# Patient Record
Sex: MALE | Race: WHITE | Employment: OTHER | URBAN - METROPOLITAN AREA
[De-identification: names, ages, dates, MRNs, and addresses within clinical notes are randomized per-mention and may not be internally consistent; named-entity substitution may affect disease eponyms.]

---

## 2017-03-09 ENCOUNTER — APPOINTMENT (OUTPATIENT)
Dept: CT IMAGING | Age: 67
End: 2017-03-09
Attending: EMERGENCY MEDICINE
Payer: MEDICARE

## 2017-03-09 ENCOUNTER — HOSPITAL ENCOUNTER (EMERGENCY)
Age: 67
Discharge: HOME | End: 2017-03-09
Attending: EMERGENCY MEDICINE
Payer: MEDICARE

## 2017-03-09 VITALS
DIASTOLIC BLOOD PRESSURE: 82 MMHG | OXYGEN SATURATION: 98 % | SYSTOLIC BLOOD PRESSURE: 156 MMHG | HEIGHT: 70 IN | RESPIRATION RATE: 16 BRPM | WEIGHT: 215 LBS | HEART RATE: 73 BPM | BODY MASS INDEX: 30.78 KG/M2 | TEMPERATURE: 98 F

## 2017-03-09 DIAGNOSIS — R19.7 DIARRHEA, UNSPECIFIED TYPE: ICD-10-CM

## 2017-03-09 DIAGNOSIS — R10.9 ABDOMINAL PAIN OF UNKNOWN ETIOLOGY: Primary | ICD-10-CM

## 2017-03-09 LAB
ALBUMIN SERPL-MCNC: 3.9 G/DL (ref 3.5–4.8)
ALP LIVER SERPL-CCNC: 87 U/L (ref 45–117)
ALT SERPL-CCNC: 36 U/L (ref 17–63)
AST SERPL-CCNC: 19 U/L (ref 15–41)
BASOPHILS # BLD AUTO: 0.01 X10(3) UL (ref 0–0.1)
BASOPHILS NFR BLD AUTO: 0.1 %
BILIRUB SERPL-MCNC: 0.7 MG/DL (ref 0.1–2)
BILIRUB UR QL STRIP.AUTO: NEGATIVE
BUN BLD-MCNC: 17 MG/DL (ref 8–20)
CALCIUM BLD-MCNC: 9.1 MG/DL (ref 8.3–10.3)
CHLORIDE: 104 MMOL/L (ref 101–111)
CLARITY UR REFRACT.AUTO: CLEAR
CO2: 24 MMOL/L (ref 22–32)
COLOR UR AUTO: YELLOW
CREAT BLD-MCNC: 1.06 MG/DL (ref 0.7–1.3)
EOSINOPHIL # BLD AUTO: 0.09 X10(3) UL (ref 0–0.3)
EOSINOPHIL NFR BLD AUTO: 1.1 %
ERYTHROCYTE [DISTWIDTH] IN BLOOD BY AUTOMATED COUNT: 13.2 % (ref 11.5–16)
GLUCOSE BLD-MCNC: 255 MG/DL (ref 70–99)
GLUCOSE UR STRIP.AUTO-MCNC: 500 MG/DL
HCT VFR BLD AUTO: 48.3 % (ref 37–53)
HGB BLD-MCNC: 15.8 G/DL (ref 13–17)
IMMATURE GRANULOCYTE COUNT: 0.02 X10(3) UL (ref 0–1)
IMMATURE GRANULOCYTE RATIO %: 0.3 %
KETONES UR STRIP.AUTO-MCNC: 40 MG/DL
LEUKOCYTE ESTERASE UR QL STRIP.AUTO: NEGATIVE
LIPASE: 153 U/L (ref 73–393)
LYMPHOCYTES # BLD AUTO: 2.04 X10(3) UL (ref 0.9–4)
LYMPHOCYTES NFR BLD AUTO: 26 %
M PROTEIN MFR SERPL ELPH: 7.8 G/DL (ref 6.1–8.3)
MCH RBC QN AUTO: 26.9 PG (ref 27–33.2)
MCHC RBC AUTO-ENTMCNC: 32.7 G/DL (ref 31–37)
MCV RBC AUTO: 82.1 FL (ref 80–99)
MONOCYTES # BLD AUTO: 0.59 X10(3) UL (ref 0.1–0.6)
MONOCYTES NFR BLD AUTO: 7.5 %
NEUTROPHIL ABS PRELIM: 5.09 X10 (3) UL (ref 1.3–6.7)
NEUTROPHILS # BLD AUTO: 5.09 X10(3) UL (ref 1.3–6.7)
NEUTROPHILS NFR BLD AUTO: 65 %
NITRITE UR QL STRIP.AUTO: NEGATIVE
PH UR STRIP.AUTO: 5 [PH] (ref 4.5–8)
PLATELET # BLD AUTO: 220 10(3)UL (ref 150–450)
POTASSIUM SERPL-SCNC: 4 MMOL/L (ref 3.6–5.1)
PROT UR STRIP.AUTO-MCNC: NEGATIVE MG/DL
RBC # BLD AUTO: 5.88 X10(6)UL (ref 3.8–5.8)
RED CELL DISTRIBUTION WIDTH-SD: 39.6 FL (ref 35.1–46.3)
SODIUM SERPL-SCNC: 138 MMOL/L (ref 136–144)
SP GR UR STRIP.AUTO: 1.02 (ref 1–1.03)
UROBILINOGEN UR STRIP.AUTO-MCNC: 0.2 MG/DL
WBC # BLD AUTO: 7.8 X10(3) UL (ref 4–13)

## 2017-03-09 PROCEDURE — 85025 COMPLETE CBC W/AUTO DIFF WBC: CPT | Performed by: EMERGENCY MEDICINE

## 2017-03-09 PROCEDURE — 36415 COLL VENOUS BLD VENIPUNCTURE: CPT

## 2017-03-09 PROCEDURE — 81003 URINALYSIS AUTO W/O SCOPE: CPT | Performed by: EMERGENCY MEDICINE

## 2017-03-09 PROCEDURE — 99284 EMERGENCY DEPT VISIT MOD MDM: CPT

## 2017-03-09 PROCEDURE — 74177 CT ABD & PELVIS W/CONTRAST: CPT

## 2017-03-09 PROCEDURE — 80053 COMPREHEN METABOLIC PANEL: CPT | Performed by: EMERGENCY MEDICINE

## 2017-03-09 PROCEDURE — 83690 ASSAY OF LIPASE: CPT | Performed by: EMERGENCY MEDICINE

## 2017-03-09 NOTE — ED PROVIDER NOTES
Patient Seen in: THE [facility] In [place]    History   Patient presents with:  Abdomen/Flank Pain (GI/)    Stated Complaint: abd pain    HPI    41-year-old male presents emergency room with chief complaint of right-sided abdominal pain Smoking Status: Never Smoker                      Smokeless Status: Never Used                        Alcohol Use: No                Review of Systems    Positive for stated complaint: abd pain  Other systems are as noted in HPI.   Constitutional and vital following orders were created for panel order CBC WITH DIFFERENTIAL WITH PLATELET.   Procedure                               Abnormality         Status                     ---------                               -----------         ------

## 2017-03-09 NOTE — ED INITIAL ASSESSMENT (HPI)
Right lower abdominal pain, intermittent, past week. Describes it as \"it feels like a golf ball is in there. \" Frequent bowel movements.  No NV

## 2018-07-11 PROBLEM — M75.41 IMPINGEMENT SYNDROME OF RIGHT SHOULDER REGION: Status: ACTIVE | Noted: 2018-07-11

## 2019-05-06 PROBLEM — M19.011 GLENOHUMERAL ARTHRITIS, RIGHT: Status: ACTIVE | Noted: 2019-05-06

## 2019-12-23 ENCOUNTER — OFFICE VISIT (OUTPATIENT)
Dept: FAMILY MEDICINE CLINIC | Facility: CLINIC | Age: 69
End: 2019-12-23
Payer: MEDICARE

## 2019-12-23 VITALS
TEMPERATURE: 99 F | BODY MASS INDEX: 31.4 KG/M2 | HEIGHT: 70 IN | WEIGHT: 219.31 LBS | OXYGEN SATURATION: 99 % | HEART RATE: 82 BPM | SYSTOLIC BLOOD PRESSURE: 160 MMHG | DIASTOLIC BLOOD PRESSURE: 80 MMHG | RESPIRATION RATE: 18 BRPM

## 2019-12-23 DIAGNOSIS — J02.9 SORE THROAT: Primary | ICD-10-CM

## 2019-12-23 PROCEDURE — 99203 OFFICE O/P NEW LOW 30 MIN: CPT | Performed by: NURSE PRACTITIONER

## 2019-12-23 PROCEDURE — 87880 STREP A ASSAY W/OPTIC: CPT | Performed by: NURSE PRACTITIONER

## 2019-12-23 NOTE — PROGRESS NOTES
HPI:    Patient ID: Rey Paredes is a 78year old male. Sore Throat    This is a new problem. Episode onset: in the past two days. The problem has been unchanged. Neither side of throat is experiencing more pain than the other. There has been no fever. Cardiovascular: Normal rate, regular rhythm and normal heart sounds. Pulmonary/Chest: Effort normal and breath sounds normal. No respiratory distress. Abdominal: Soft. Musculoskeletal: Normal range of motion.      Lymphadenopathy:     He has cervical ad · Suck on throat lozenges, cough drops, hard candy, ice chips, or frozen fruit-juice bars. Use the sugar-free versions if your diet or medical condition requires them. Gargle to ease irritation  Gargling every hour or 2 can ease irritation.  Try gargling w

## 2022-07-06 DIAGNOSIS — E10.9 TYPE 1 DIABETES MELLITUS WITHOUT COMPLICATION (CMD): Primary | ICD-10-CM

## 2022-07-06 RX ORDER — INSULIN LISPRO 100 [IU]/ML
50 INJECTION, SOLUTION INTRAVENOUS; SUBCUTANEOUS
Qty: 45 ML | Refills: 2 | Status: SHIPPED | OUTPATIENT
Start: 2022-07-06 | End: 2022-12-21 | Stop reason: SDUPTHER

## 2022-07-06 RX ORDER — INSULIN LISPRO 100 [IU]/ML
50 INJECTION, SOLUTION INTRAVENOUS; SUBCUTANEOUS
Qty: 45 ML | Refills: 2 | Status: SHIPPED | OUTPATIENT
Start: 2022-07-06 | End: 2022-07-06 | Stop reason: SDUPTHER

## 2022-07-26 ENCOUNTER — TELEPHONE (OUTPATIENT)
Dept: PEDIATRIC ENDOCRINOLOGY | Age: 72
End: 2022-07-26

## 2022-07-26 DIAGNOSIS — E11.8 TYPE 2 DIABETES WITH COMPLICATION (CMD): Primary | ICD-10-CM

## 2022-07-26 RX ORDER — INSULIN HUMAN 500 [IU]/ML
50 INJECTION, SOLUTION SUBCUTANEOUS SEE ADMIN INSTRUCTIONS
Qty: 27 ML | Refills: 11 | Status: SHIPPED | OUTPATIENT
Start: 2022-07-26 | End: 2023-11-03

## 2022-07-29 ENCOUNTER — TELEPHONE (OUTPATIENT)
Dept: PEDIATRIC ENDOCRINOLOGY | Age: 72
End: 2022-07-29

## 2022-08-01 ENCOUNTER — MED INFO FORMS (OUTPATIENT)
Dept: HEALTH INFORMATION MANAGEMENT | Facility: OTHER | Age: 72
End: 2022-08-01

## 2022-08-01 DIAGNOSIS — E11.8 TYPE 2 DIABETES WITH COMPLICATION (CMD): ICD-10-CM

## 2022-08-01 RX ORDER — LANCETS 33 GAUGE
EACH MISCELLANEOUS
COMMUNITY

## 2022-10-07 ENCOUNTER — APPOINTMENT (OUTPATIENT)
Dept: CT IMAGING | Age: 72
End: 2022-10-07
Attending: EMERGENCY MEDICINE
Payer: MEDICARE

## 2022-10-07 ENCOUNTER — HOSPITAL ENCOUNTER (EMERGENCY)
Age: 72
Discharge: HOME | End: 2022-10-07
Attending: EMERGENCY MEDICINE
Payer: MEDICARE

## 2022-10-07 VITALS
HEART RATE: 72 BPM | DIASTOLIC BLOOD PRESSURE: 58 MMHG | SYSTOLIC BLOOD PRESSURE: 135 MMHG | OXYGEN SATURATION: 97 % | HEIGHT: 70 IN | WEIGHT: 210 LBS | BODY MASS INDEX: 30.06 KG/M2 | RESPIRATION RATE: 20 BRPM | TEMPERATURE: 97 F

## 2022-10-07 DIAGNOSIS — A08.4 VIRAL ENTERITIS: Primary | ICD-10-CM

## 2022-10-07 DIAGNOSIS — K57.30 DIVERTICULOSIS OF COLON: ICD-10-CM

## 2022-10-07 DIAGNOSIS — N28.9 RENAL INSUFFICIENCY: ICD-10-CM

## 2022-10-07 DIAGNOSIS — R60.9 PERIPHERAL EDEMA: ICD-10-CM

## 2022-10-07 LAB
ALBUMIN SERPL-MCNC: 3.6 G/DL (ref 3.4–5)
ALBUMIN/GLOB SERPL: 1 {RATIO} (ref 1–2)
ALP LIVER SERPL-CCNC: 91 U/L
ALT SERPL-CCNC: 31 U/L
ANION GAP SERPL CALC-SCNC: 7 MMOL/L (ref 0–18)
AST SERPL-CCNC: 19 U/L (ref 15–37)
BASOPHILS # BLD AUTO: 0.01 X10(3) UL (ref 0–0.2)
BASOPHILS NFR BLD AUTO: 0.1 %
BILIRUB SERPL-MCNC: 0.6 MG/DL (ref 0.1–2)
BILIRUB UR QL STRIP.AUTO: NEGATIVE
BUN BLD-MCNC: 30 MG/DL (ref 7–18)
CALCIUM BLD-MCNC: 8.8 MG/DL (ref 8.5–10.1)
CHLORIDE SERPL-SCNC: 105 MMOL/L (ref 98–112)
CHOLEST SERPL-MCNC: 224 MG/DL (ref ?–200)
CLARITY UR REFRACT.AUTO: CLEAR
CO2 SERPL-SCNC: 25 MMOL/L (ref 21–32)
COLOR UR AUTO: YELLOW
CREAT BLD-MCNC: 1.86 MG/DL
EOSINOPHIL # BLD AUTO: 0.18 X10(3) UL (ref 0–0.7)
EOSINOPHIL NFR BLD AUTO: 1.8 %
ERYTHROCYTE [DISTWIDTH] IN BLOOD BY AUTOMATED COUNT: 13.1 %
GFR SERPLBLD BASED ON 1.73 SQ M-ARVRAT: 38 ML/MIN/1.73M2 (ref 60–?)
GLOBULIN PLAS-MCNC: 3.7 G/DL (ref 2.8–4.4)
GLUCOSE BLD-MCNC: 262 MG/DL (ref 70–99)
GLUCOSE UR STRIP.AUTO-MCNC: 500 MG/DL
HCT VFR BLD AUTO: 40.5 %
HDLC SERPL-MCNC: 56 MG/DL (ref 40–59)
HGB BLD-MCNC: 13.4 G/DL
IMM GRANULOCYTES # BLD AUTO: 0.02 X10(3) UL (ref 0–1)
IMM GRANULOCYTES NFR BLD: 0.2 %
LDLC SERPL CALC-MCNC: 144 MG/DL (ref ?–100)
LEUKOCYTE ESTERASE UR QL STRIP.AUTO: NEGATIVE
LIPASE SERPL-CCNC: 128 U/L (ref 73–393)
LYMPHOCYTES # BLD AUTO: 2.65 X10(3) UL (ref 1–4)
LYMPHOCYTES NFR BLD AUTO: 26.6 %
MCH RBC QN AUTO: 27.6 PG (ref 26–34)
MCHC RBC AUTO-ENTMCNC: 33.1 G/DL (ref 31–37)
MCV RBC AUTO: 83.3 FL
MONOCYTES # BLD AUTO: 0.67 X10(3) UL (ref 0.1–1)
MONOCYTES NFR BLD AUTO: 6.7 %
NEUTROPHILS # BLD AUTO: 6.43 X10 (3) UL (ref 1.5–7.7)
NEUTROPHILS # BLD AUTO: 6.43 X10(3) UL (ref 1.5–7.7)
NEUTROPHILS NFR BLD AUTO: 64.6 %
NITRITE UR QL STRIP.AUTO: NEGATIVE
NONHDLC SERPL-MCNC: 168 MG/DL (ref ?–130)
NT-PROBNP SERPL-MCNC: 133 PG/ML (ref ?–125)
OSMOLALITY SERPL CALC.SUM OF ELEC: 299 MOSM/KG (ref 275–295)
PH UR STRIP.AUTO: 5 [PH] (ref 5–8)
PLATELET # BLD AUTO: 293 10(3)UL (ref 150–450)
POTASSIUM SERPL-SCNC: 4.4 MMOL/L (ref 3.5–5.1)
PROT SERPL-MCNC: 7.3 G/DL (ref 6.4–8.2)
RBC # BLD AUTO: 4.86 X10(6)UL
SODIUM SERPL-SCNC: 137 MMOL/L (ref 136–145)
SP GR UR STRIP.AUTO: 1.02 (ref 1–1.03)
TRIGL SERPL-MCNC: 137 MG/DL (ref 30–149)
TROPONIN I HIGH SENSITIVITY: 10 NG/L
UROBILINOGEN UR STRIP.AUTO-MCNC: 0.2 MG/DL
VLDLC SERPL CALC-MCNC: 26 MG/DL (ref 0–30)
WBC # BLD AUTO: 10 X10(3) UL (ref 4–11)

## 2022-10-07 PROCEDURE — 93010 ELECTROCARDIOGRAM REPORT: CPT

## 2022-10-07 PROCEDURE — 81015 MICROSCOPIC EXAM OF URINE: CPT | Performed by: EMERGENCY MEDICINE

## 2022-10-07 PROCEDURE — 81001 URINALYSIS AUTO W/SCOPE: CPT | Performed by: EMERGENCY MEDICINE

## 2022-10-07 PROCEDURE — 83690 ASSAY OF LIPASE: CPT | Performed by: EMERGENCY MEDICINE

## 2022-10-07 PROCEDURE — 99284 EMERGENCY DEPT VISIT MOD MDM: CPT

## 2022-10-07 PROCEDURE — 93005 ELECTROCARDIOGRAM TRACING: CPT

## 2022-10-07 PROCEDURE — 74176 CT ABD & PELVIS W/O CONTRAST: CPT | Performed by: EMERGENCY MEDICINE

## 2022-10-07 PROCEDURE — 36415 COLL VENOUS BLD VENIPUNCTURE: CPT

## 2022-10-07 PROCEDURE — 83880 ASSAY OF NATRIURETIC PEPTIDE: CPT | Performed by: EMERGENCY MEDICINE

## 2022-10-07 PROCEDURE — 80061 LIPID PANEL: CPT | Performed by: EMERGENCY MEDICINE

## 2022-10-07 PROCEDURE — 84484 ASSAY OF TROPONIN QUANT: CPT | Performed by: EMERGENCY MEDICINE

## 2022-10-07 PROCEDURE — 85025 COMPLETE CBC W/AUTO DIFF WBC: CPT | Performed by: EMERGENCY MEDICINE

## 2022-10-07 PROCEDURE — 80053 COMPREHEN METABOLIC PANEL: CPT | Performed by: EMERGENCY MEDICINE

## 2022-10-07 RX ORDER — DICYCLOMINE HCL 20 MG
20 TABLET ORAL 4 TIMES DAILY PRN
Qty: 30 TABLET | Refills: 0 | Status: SHIPPED | OUTPATIENT
Start: 2022-10-07 | End: 2022-11-06

## 2022-10-07 RX ORDER — PHYTONADIONE (VIT K1) 100 MCG
TABLET ORAL
COMMUNITY

## 2022-10-07 RX ORDER — PANTOPRAZOLE SODIUM 40 MG/1
40 TABLET, DELAYED RELEASE ORAL DAILY
Qty: 30 TABLET | Refills: 0 | Status: SHIPPED | OUTPATIENT
Start: 2022-10-07 | End: 2022-11-06

## 2022-10-07 RX ORDER — MAGNESIUM OXIDE 400 MG (241.3 MG MAGNESIUM) TABLET
120 TABLET
COMMUNITY

## 2022-10-07 NOTE — ED INITIAL ASSESSMENT (HPI)
LUQ abd pain for several weeks. States possible worse after eating. States sometimes feels nauseous but has not vomited.

## 2022-10-08 LAB
ATRIAL RATE: 84 BPM
P AXIS: 37 DEGREES
P-R INTERVAL: 192 MS
Q-T INTERVAL: 398 MS
QRS DURATION: 142 MS
QTC CALCULATION (BEZET): 450 MS
R AXIS: -50 DEGREES
T AXIS: 11 DEGREES
VENTRICULAR RATE: 77 BPM

## 2022-12-20 ENCOUNTER — TELEPHONE (OUTPATIENT)
Dept: ENDOCRINOLOGY | Age: 72
End: 2022-12-20

## 2022-12-20 DIAGNOSIS — E10.9 TYPE 1 DIABETES MELLITUS WITHOUT COMPLICATION (CMD): ICD-10-CM

## 2022-12-20 DIAGNOSIS — E11.8 TYPE 2 DIABETES WITH COMPLICATION (CMD): ICD-10-CM

## 2022-12-21 RX ORDER — INSULIN LISPRO 100 [IU]/ML
50 INJECTION, SOLUTION INTRAVENOUS; SUBCUTANEOUS
Qty: 45 ML | Refills: 2 | Status: SHIPPED | OUTPATIENT
Start: 2022-12-21 | End: 2022-12-21 | Stop reason: SDUPTHER

## 2022-12-21 RX ORDER — INSULIN LISPRO 100 [IU]/ML
50 INJECTION, SOLUTION INTRAVENOUS; SUBCUTANEOUS
Qty: 135 ML | Refills: 0 | Status: SHIPPED | OUTPATIENT
Start: 2022-12-21 | End: 2023-02-02 | Stop reason: CLARIF

## 2022-12-28 ENCOUNTER — TELEPHONE (OUTPATIENT)
Dept: PEDIATRIC ENDOCRINOLOGY | Age: 72
End: 2022-12-28

## 2023-01-03 ENCOUNTER — EXTERNAL RECORD (OUTPATIENT)
Dept: HEALTH INFORMATION MANAGEMENT | Facility: OTHER | Age: 73
End: 2023-01-03

## 2023-02-01 ENCOUNTER — OFFICE VISIT (OUTPATIENT)
Dept: ENDOCRINOLOGY | Age: 73
End: 2023-02-01

## 2023-02-01 VITALS
WEIGHT: 212.08 LBS | HEART RATE: 84 BPM | TEMPERATURE: 97.9 F | SYSTOLIC BLOOD PRESSURE: 169 MMHG | DIASTOLIC BLOOD PRESSURE: 82 MMHG

## 2023-02-01 DIAGNOSIS — I10 ESSENTIAL HYPERTENSION: ICD-10-CM

## 2023-02-01 DIAGNOSIS — N18.32 STAGE 3B CHRONIC KIDNEY DISEASE (CMD): ICD-10-CM

## 2023-02-01 DIAGNOSIS — G63 POLYNEUROPATHY IN DISEASES CLASSIFIED ELSEWHERE (CMD): ICD-10-CM

## 2023-02-01 DIAGNOSIS — E78.5 DYSLIPIDEMIA: ICD-10-CM

## 2023-02-01 DIAGNOSIS — E11.8 TYPE 2 DIABETES WITH COMPLICATION (CMD): Primary | ICD-10-CM

## 2023-02-01 LAB — HBA1C MFR BLD: 11.6 % (ref 4.5–5.6)

## 2023-02-01 PROCEDURE — 3079F DIAST BP 80-89 MM HG: CPT | Performed by: INTERNAL MEDICINE

## 2023-02-01 PROCEDURE — 3077F SYST BP >= 140 MM HG: CPT | Performed by: INTERNAL MEDICINE

## 2023-02-01 PROCEDURE — 99214 OFFICE O/P EST MOD 30 MIN: CPT | Performed by: INTERNAL MEDICINE

## 2023-02-01 PROCEDURE — 36415 COLL VENOUS BLD VENIPUNCTURE: CPT | Performed by: INTERNAL MEDICINE

## 2023-02-01 PROCEDURE — 83036 HEMOGLOBIN GLYCOSYLATED A1C: CPT | Performed by: INTERNAL MEDICINE

## 2023-02-01 RX ORDER — ATORVASTATIN CALCIUM 20 MG/1
20 TABLET, FILM COATED ORAL AT BEDTIME
Qty: 90 TABLET | Refills: 3 | Status: SHIPPED | OUTPATIENT
Start: 2023-02-01

## 2023-02-01 RX ORDER — DULAGLUTIDE 0.75 MG/.5ML
0.75 INJECTION, SOLUTION SUBCUTANEOUS
Qty: 2 ML | Refills: 1 | Status: SHIPPED | OUTPATIENT
Start: 2023-02-01 | End: 2023-11-03

## 2023-02-01 RX ORDER — AMLODIPINE BESYLATE 10 MG/1
10 TABLET ORAL DAILY
Qty: 90 TABLET | Refills: 3 | Status: SHIPPED | OUTPATIENT
Start: 2023-02-01

## 2023-03-03 ENCOUNTER — TELEPHONE (OUTPATIENT)
Dept: ENDOCRINOLOGY | Age: 73
End: 2023-03-03

## 2023-03-10 ENCOUNTER — CLINICAL DOCUMENTATION (OUTPATIENT)
Dept: PEDIATRIC ENDOCRINOLOGY | Age: 73
End: 2023-03-10

## 2023-10-19 ENCOUNTER — TELEPHONE (OUTPATIENT)
Dept: ENDOCRINOLOGY | Age: 73
End: 2023-10-19

## 2023-11-01 ENCOUNTER — TELEPHONE (OUTPATIENT)
Dept: ENDOCRINOLOGY | Age: 73
End: 2023-11-01

## 2023-11-03 ENCOUNTER — OFFICE VISIT (OUTPATIENT)
Dept: ENDOCRINOLOGY | Age: 73
End: 2023-11-03

## 2023-11-03 ENCOUNTER — TELEPHONE (OUTPATIENT)
Dept: PEDIATRIC ENDOCRINOLOGY | Age: 73
End: 2023-11-03

## 2023-11-03 VITALS
SYSTOLIC BLOOD PRESSURE: 136 MMHG | WEIGHT: 210.54 LBS | BODY MASS INDEX: 35.08 KG/M2 | HEIGHT: 65 IN | DIASTOLIC BLOOD PRESSURE: 85 MMHG | OXYGEN SATURATION: 97 % | HEART RATE: 73 BPM

## 2023-11-03 DIAGNOSIS — E11.65 UNCONTROLLED TYPE 2 DIABETES MELLITUS WITH HYPERGLYCEMIA, WITH LONG-TERM CURRENT USE OF INSULIN (CMD): Primary | ICD-10-CM

## 2023-11-03 DIAGNOSIS — N18.32 STAGE 3B CHRONIC KIDNEY DISEASE (CMD): ICD-10-CM

## 2023-11-03 DIAGNOSIS — Z53.20 MEDICATION THERAPY MANAGEMENT RECOMMENDATION REFUSED BY PATIENT: ICD-10-CM

## 2023-11-03 DIAGNOSIS — Z79.4 UNCONTROLLED TYPE 2 DIABETES MELLITUS WITH HYPERGLYCEMIA, WITH LONG-TERM CURRENT USE OF INSULIN (CMD): Primary | ICD-10-CM

## 2023-11-03 DIAGNOSIS — E88.1 LIPODYSTROPHY DUE TO INSULIN: ICD-10-CM

## 2023-11-03 DIAGNOSIS — I10 ESSENTIAL HYPERTENSION: ICD-10-CM

## 2023-11-03 DIAGNOSIS — Z91.148 NON COMPLIANCE W MEDICATION REGIMEN: ICD-10-CM

## 2023-11-03 DIAGNOSIS — G63 POLYNEUROPATHY IN DISEASES CLASSIFIED ELSEWHERE (CMD): ICD-10-CM

## 2023-11-03 DIAGNOSIS — R63.8 DIETARY INDISCRETION: ICD-10-CM

## 2023-11-03 DIAGNOSIS — E78.5 DYSLIPIDEMIA: ICD-10-CM

## 2023-11-03 PROCEDURE — 3075F SYST BP GE 130 - 139MM HG: CPT | Performed by: INTERNAL MEDICINE

## 2023-11-03 PROCEDURE — 3079F DIAST BP 80-89 MM HG: CPT | Performed by: INTERNAL MEDICINE

## 2023-11-03 PROCEDURE — 99214 OFFICE O/P EST MOD 30 MIN: CPT | Performed by: INTERNAL MEDICINE

## 2023-12-03 DIAGNOSIS — E11.8 TYPE 2 DIABETES WITH COMPLICATION (CMD): ICD-10-CM

## 2023-12-05 RX ORDER — INSULIN HUMAN 500 [IU]/ML
INJECTION, SOLUTION SUBCUTANEOUS
Qty: 27 ML | Refills: 11 | Status: SHIPPED | OUTPATIENT
Start: 2023-12-05

## 2024-02-29 ENCOUNTER — APPOINTMENT (OUTPATIENT)
Dept: ENDOCRINOLOGY | Age: 74
End: 2024-02-29

## 2024-04-19 ENCOUNTER — HOSPITAL ENCOUNTER (INPATIENT)
Facility: HOSPITAL | Age: 74
LOS: 6 days | Discharge: NURSING, REHAB OR LONG-TERM CARE | End: 2024-04-26
Attending: EMERGENCY MEDICINE | Admitting: HOSPITALIST
Payer: MEDICARE

## 2024-04-19 ENCOUNTER — APPOINTMENT (OUTPATIENT)
Dept: GENERAL RADIOLOGY | Facility: HOSPITAL | Age: 74
End: 2024-04-19
Payer: MEDICARE

## 2024-04-19 ENCOUNTER — APPOINTMENT (OUTPATIENT)
Dept: MRI IMAGING | Facility: HOSPITAL | Age: 74
End: 2024-04-19
Attending: EMERGENCY MEDICINE
Payer: MEDICARE

## 2024-04-19 DIAGNOSIS — I63.9 ACUTE CVA (CEREBROVASCULAR ACCIDENT) (HCC): Primary | ICD-10-CM

## 2024-04-19 LAB
ALBUMIN SERPL-MCNC: 3.6 G/DL (ref 3.4–5)
ALBUMIN/GLOB SERPL: 1 {RATIO} (ref 1–2)
ALP LIVER SERPL-CCNC: 99 U/L
ALT SERPL-CCNC: 23 U/L
ANION GAP SERPL CALC-SCNC: 9 MMOL/L (ref 0–18)
APTT PPP: 25.4 SECONDS (ref 23.3–35.6)
AST SERPL-CCNC: 25 U/L (ref 15–37)
BASOPHILS # BLD AUTO: 0.01 X10(3) UL (ref 0–0.2)
BASOPHILS NFR BLD AUTO: 0.1 %
BILIRUB SERPL-MCNC: 0.4 MG/DL (ref 0.1–2)
BILIRUB UR QL STRIP.AUTO: NEGATIVE
BUN BLD-MCNC: 33 MG/DL (ref 9–23)
CALCIUM BLD-MCNC: 8.9 MG/DL (ref 8.5–10.1)
CHLORIDE SERPL-SCNC: 109 MMOL/L (ref 98–112)
CLARITY UR REFRACT.AUTO: CLEAR
CO2 SERPL-SCNC: 22 MMOL/L (ref 21–32)
CREAT BLD-MCNC: 1.85 MG/DL
EGFRCR SERPLBLD CKD-EPI 2021: 38 ML/MIN/1.73M2 (ref 60–?)
EOSINOPHIL # BLD AUTO: 0.1 X10(3) UL (ref 0–0.7)
EOSINOPHIL NFR BLD AUTO: 1.1 %
ERYTHROCYTE [DISTWIDTH] IN BLOOD BY AUTOMATED COUNT: 13.2 %
GLOBULIN PLAS-MCNC: 3.7 G/DL (ref 2.8–4.4)
GLUCOSE BLD-MCNC: 197 MG/DL (ref 70–99)
GLUCOSE BLD-MCNC: 206 MG/DL (ref 70–99)
GLUCOSE UR STRIP.AUTO-MCNC: 300 MG/DL
HCT VFR BLD AUTO: 40.9 %
HGB BLD-MCNC: 13.4 G/DL
IMM GRANULOCYTES # BLD AUTO: 0.02 X10(3) UL (ref 0–1)
IMM GRANULOCYTES NFR BLD: 0.2 %
INR BLD: 1 (ref 0.8–1.2)
KETONES UR STRIP.AUTO-MCNC: 10 MG/DL
LEUKOCYTE ESTERASE UR QL STRIP.AUTO: NEGATIVE
LYMPHOCYTES # BLD AUTO: 1.42 X10(3) UL (ref 1–4)
LYMPHOCYTES NFR BLD AUTO: 15.8 %
MCH RBC QN AUTO: 27 PG (ref 26–34)
MCHC RBC AUTO-ENTMCNC: 32.8 G/DL (ref 31–37)
MCV RBC AUTO: 82.5 FL
MONOCYTES # BLD AUTO: 0.63 X10(3) UL (ref 0.1–1)
MONOCYTES NFR BLD AUTO: 7 %
NEUTROPHILS # BLD AUTO: 6.8 X10 (3) UL (ref 1.5–7.7)
NEUTROPHILS # BLD AUTO: 6.8 X10(3) UL (ref 1.5–7.7)
NEUTROPHILS NFR BLD AUTO: 75.8 %
NITRITE UR QL STRIP.AUTO: NEGATIVE
OSMOLALITY SERPL CALC.SUM OF ELEC: 303 MOSM/KG (ref 275–295)
PH UR STRIP.AUTO: 5.5 [PH] (ref 5–8)
PLATELET # BLD AUTO: 274 10(3)UL (ref 150–450)
POTASSIUM SERPL-SCNC: 4.1 MMOL/L (ref 3.5–5.1)
PROT SERPL-MCNC: 7.3 G/DL (ref 6.4–8.2)
PROT UR STRIP.AUTO-MCNC: 100 MG/DL
PROTHROMBIN TIME: 13.2 SECONDS (ref 11.6–14.8)
RBC # BLD AUTO: 4.96 X10(6)UL
RBC #/AREA URNS AUTO: >10 /HPF
SODIUM SERPL-SCNC: 140 MMOL/L (ref 136–145)
SP GR UR STRIP.AUTO: 1.02 (ref 1–1.03)
TROPONIN I SERPL HS-MCNC: 10 NG/L
UROBILINOGEN UR STRIP.AUTO-MCNC: NORMAL MG/DL
WBC # BLD AUTO: 9 X10(3) UL (ref 4–11)

## 2024-04-19 PROCEDURE — 70546 MR ANGIOGRAPH HEAD W/O&W/DYE: CPT | Performed by: EMERGENCY MEDICINE

## 2024-04-19 PROCEDURE — 71045 X-RAY EXAM CHEST 1 VIEW: CPT | Performed by: EMERGENCY MEDICINE

## 2024-04-19 PROCEDURE — 70549 MR ANGIOGRAPH NECK W/O&W/DYE: CPT | Performed by: EMERGENCY MEDICINE

## 2024-04-19 PROCEDURE — 70553 MRI BRAIN STEM W/O & W/DYE: CPT | Performed by: EMERGENCY MEDICINE

## 2024-04-19 RX ORDER — CLOPIDOGREL BISULFATE 75 MG/1
75 TABLET ORAL ONCE
Status: COMPLETED | OUTPATIENT
Start: 2024-04-19 | End: 2024-04-19

## 2024-04-19 RX ORDER — ASPIRIN 81 MG/1
81 TABLET, CHEWABLE ORAL ONCE
Status: COMPLETED | OUTPATIENT
Start: 2024-04-19 | End: 2024-04-19

## 2024-04-19 RX ORDER — ATORVASTATIN CALCIUM 20 MG/1
20 TABLET, FILM COATED ORAL NIGHTLY
Status: DISCONTINUED | OUTPATIENT
Start: 2024-04-19 | End: 2024-04-20

## 2024-04-19 RX ORDER — LORAZEPAM 1 MG/1
1 TABLET ORAL ONCE
Status: COMPLETED | OUTPATIENT
Start: 2024-04-19 | End: 2024-04-19

## 2024-04-19 RX ORDER — GADOTERATE MEGLUMINE 376.9 MG/ML
20 INJECTION INTRAVENOUS
Status: COMPLETED | OUTPATIENT
Start: 2024-04-19 | End: 2024-04-19

## 2024-04-19 NOTE — ED INITIAL ASSESSMENT (HPI)
Patient endorses Right leg numbness and weakness, hx of DVT in right leg, no currently on thinners, Spencer negative per EMS. A/o x 4. Ataxia noted on Right side.

## 2024-04-20 ENCOUNTER — APPOINTMENT (OUTPATIENT)
Dept: CV DIAGNOSTICS | Facility: HOSPITAL | Age: 74
End: 2024-04-20
Attending: HOSPITALIST
Payer: MEDICARE

## 2024-04-20 LAB
ATRIAL RATE: 76 BPM
CHOLEST SERPL-MCNC: 208 MG/DL (ref ?–200)
EST. AVERAGE GLUCOSE BLD GHB EST-MCNC: 301 MG/DL (ref 68–126)
GLUCOSE BLD-MCNC: 273 MG/DL (ref 70–99)
GLUCOSE BLD-MCNC: 304 MG/DL (ref 70–99)
GLUCOSE BLD-MCNC: 346 MG/DL (ref 70–99)
HBA1C MFR BLD: 12.1 % (ref ?–5.7)
HDLC SERPL-MCNC: 56 MG/DL (ref 40–59)
LDLC SERPL CALC-MCNC: 134 MG/DL (ref ?–100)
NONHDLC SERPL-MCNC: 152 MG/DL (ref ?–130)
P AXIS: 30 DEGREES
P-R INTERVAL: 194 MS
Q-T INTERVAL: 402 MS
QRS DURATION: 146 MS
QTC CALCULATION (BEZET): 452 MS
R AXIS: -50 DEGREES
T AXIS: 10 DEGREES
TRIGL SERPL-MCNC: 101 MG/DL (ref 30–149)
VENTRICULAR RATE: 76 BPM
VLDLC SERPL CALC-MCNC: 18 MG/DL (ref 0–30)

## 2024-04-20 PROCEDURE — 99223 1ST HOSP IP/OBS HIGH 75: CPT | Performed by: OTHER

## 2024-04-20 PROCEDURE — 93306 TTE W/DOPPLER COMPLETE: CPT | Performed by: HOSPITALIST

## 2024-04-20 RX ORDER — METOCLOPRAMIDE HYDROCHLORIDE 5 MG/ML
10 INJECTION INTRAMUSCULAR; INTRAVENOUS EVERY 8 HOURS PRN
Status: DISCONTINUED | OUTPATIENT
Start: 2024-04-20 | End: 2024-04-26

## 2024-04-20 RX ORDER — DEXTROSE MONOHYDRATE 25 G/50ML
50 INJECTION, SOLUTION INTRAVENOUS
Status: DISCONTINUED | OUTPATIENT
Start: 2024-04-20 | End: 2024-04-26

## 2024-04-20 RX ORDER — ATORVASTATIN CALCIUM 80 MG/1
80 TABLET, FILM COATED ORAL NIGHTLY
Qty: 90 TABLET | Refills: 0 | Status: SHIPPED | OUTPATIENT
Start: 2024-04-20 | End: 2024-07-19

## 2024-04-20 RX ORDER — ACETAMINOPHEN 650 MG/1
650 SUPPOSITORY RECTAL EVERY 4 HOURS PRN
Status: DISCONTINUED | OUTPATIENT
Start: 2024-04-20 | End: 2024-04-26

## 2024-04-20 RX ORDER — HEPARIN SODIUM 5000 [USP'U]/ML
5000 INJECTION, SOLUTION INTRAVENOUS; SUBCUTANEOUS EVERY 8 HOURS SCHEDULED
Status: DISCONTINUED | OUTPATIENT
Start: 2024-04-20 | End: 2024-04-26

## 2024-04-20 RX ORDER — CLOPIDOGREL BISULFATE 75 MG/1
75 TABLET ORAL DAILY
Qty: 90 TABLET | Refills: 0 | Status: SHIPPED | OUTPATIENT
Start: 2024-04-21 | End: 2024-07-20

## 2024-04-20 RX ORDER — SODIUM CHLORIDE 9 MG/ML
INJECTION, SOLUTION INTRAVENOUS CONTINUOUS
Status: ACTIVE | OUTPATIENT
Start: 2024-04-20 | End: 2024-04-22

## 2024-04-20 RX ORDER — ACETAMINOPHEN 500 MG
500 TABLET ORAL EVERY 4 HOURS PRN
Status: DISCONTINUED | OUTPATIENT
Start: 2024-04-20 | End: 2024-04-26

## 2024-04-20 RX ORDER — NICOTINE POLACRILEX 4 MG
30 LOZENGE BUCCAL
Status: DISCONTINUED | OUTPATIENT
Start: 2024-04-20 | End: 2024-04-26

## 2024-04-20 RX ORDER — LABETALOL HYDROCHLORIDE 5 MG/ML
10 INJECTION, SOLUTION INTRAVENOUS EVERY 10 MIN PRN
Status: DISCONTINUED | OUTPATIENT
Start: 2024-04-20 | End: 2024-04-26

## 2024-04-20 RX ORDER — INSULIN DEGLUDEC 100 U/ML
30 INJECTION, SOLUTION SUBCUTANEOUS DAILY
Status: DISCONTINUED | OUTPATIENT
Start: 2024-04-20 | End: 2024-04-21

## 2024-04-20 RX ORDER — CLOPIDOGREL BISULFATE 75 MG/1
75 TABLET ORAL DAILY
Status: DISCONTINUED | OUTPATIENT
Start: 2024-04-21 | End: 2024-04-26

## 2024-04-20 RX ORDER — ASPIRIN 81 MG/1
81 TABLET, CHEWABLE ORAL DAILY
Status: DISCONTINUED | OUTPATIENT
Start: 2024-04-20 | End: 2024-04-26

## 2024-04-20 RX ORDER — ASPIRIN 81 MG/1
81 TABLET, CHEWABLE ORAL DAILY
Qty: 90 TABLET | Refills: 0 | Status: SHIPPED | OUTPATIENT
Start: 2024-04-21 | End: 2024-07-20

## 2024-04-20 RX ORDER — HYDRALAZINE HYDROCHLORIDE 20 MG/ML
10 INJECTION INTRAMUSCULAR; INTRAVENOUS EVERY 2 HOUR PRN
Status: DISCONTINUED | OUTPATIENT
Start: 2024-04-20 | End: 2024-04-24

## 2024-04-20 RX ORDER — NICOTINE POLACRILEX 4 MG
15 LOZENGE BUCCAL
Status: DISCONTINUED | OUTPATIENT
Start: 2024-04-20 | End: 2024-04-26

## 2024-04-20 RX ORDER — ONDANSETRON 2 MG/ML
4 INJECTION INTRAMUSCULAR; INTRAVENOUS EVERY 6 HOURS PRN
Status: DISCONTINUED | OUTPATIENT
Start: 2024-04-20 | End: 2024-04-26

## 2024-04-20 RX ORDER — ATORVASTATIN CALCIUM 80 MG/1
80 TABLET, FILM COATED ORAL NIGHTLY
Status: DISCONTINUED | OUTPATIENT
Start: 2024-04-20 | End: 2024-04-26

## 2024-04-20 RX ORDER — ACETAMINOPHEN 325 MG/1
650 TABLET ORAL EVERY 4 HOURS PRN
Status: DISCONTINUED | OUTPATIENT
Start: 2024-04-20 | End: 2024-04-26

## 2024-04-20 RX ORDER — CLOPIDOGREL BISULFATE 75 MG/1
75 TABLET ORAL DAILY
Status: DISCONTINUED | OUTPATIENT
Start: 2024-04-20 | End: 2024-04-20

## 2024-04-20 NOTE — ED PROVIDER NOTES
Patient Seen in: [facility]      History     Chief Complaint   Patient presents with    Numbness Weakness     Stated Complaint: numbness in Right leg    Subjective:   HPI    74-year-old male history of diabetes presents to ED with complaint of onset of right arm and leg weakness and numbness and difficulty using his right hand starting Thursday morning.  He states he awoke with the symptoms.  Denies speech difficulty, facial droop.   Denies history of stroke.  He states that he mowed the lawn for 2 hours straight on Wednesday and had some cramping in his right leg.  He states he has a history of DVT in the right leg, no longer on thinners.  Denies history of stroke.    Objective:   Past Medical History:    Diabetes mellitus (HCC)    Type II or unspecified type diabetes mellitus without mention of complication, not stated as uncontrolled              Past Surgical History:   Procedure Laterality Date    Colonoscopy      2005    Tonsillectomy                  Social History     Socioeconomic History    Marital status:    Tobacco Use    Smoking status: Never    Smokeless tobacco: Never   Vaping Use    Vaping status: Never Used   Substance and Sexual Activity    Alcohol use: No    Drug use: No     Social Determinants of Health      Received from [facility]    Social Connections    Received from [facility]    Housing Stability              Review of Systems    Positive for stated complaint: numbness in Right leg  Other systems are as noted in HPI.  Constitutional and vital signs reviewed.      All other systems reviewed and negative except as noted above.    Physical Exam     ED Triage Vitals   BP 04/19/24 1719 (!) 196/96   Pulse 04/19/24 1719 74   Resp 04/19/24 1719 18   Temp 04/19/24 1724 98.6 °F (37 °C)   Temp src 04/19/24 1724 Temporal   SpO2 04/19/24 1719 97 %   O2 Device 04/19/24 2030 None (Room air)       Current:BP (!) 185/88   Pulse 67    Temp 98.6 °F (37 °C) (Temporal)   Resp 23   Wt 97.5 kg   SpO2 96%   BMI 30.85 kg/m²         Physical Exam    Vital signs reviewed.  Nursing note reviewed.  Constitutional: Alert, well-appearing  Head: Normocephalic, atraumatic  Mouth: Moist  Eyes: Extraocular muscles intact, pupils equal  Cardiovascular: Regular rate and rhythm  Pulmonary: Effort normal, breath sounds normal  Abdomen: Soft, nontender nondistended  Skin: Warm and dry  Musculoskeletal range of motion grossly normal all extremities  Neuro: Alert, oriented x 3, face symmetric, speech clear.  Right arm pronator drift, right  4/5.  Ataxic right hand with finger-to-nose.  Right leg unable to lift against gravity.  Left upper and lower extremity 5/5.  Psych: Mood normal          ED Course     Labs Reviewed   COMP METABOLIC PANEL (14) - Abnormal; Notable for the following components:       Result Value    Glucose 206 (*)     BUN 33 (*)     Creatinine 1.85 (*)     Calculated Osmolality 303 (*)     eGFR-Cr 38 (*)     All other components within normal limits   URINALYSIS WITH CULTURE REFLEX - Abnormal; Notable for the following components:    Glucose Urine 300 (*)     Ketones Urine 10 (*)     Blood Urine 2+ (*)     Protein Urine 100 (*)     RBC Urine >10 (*)     All other components within normal limits   POCT GLUCOSE - Abnormal; Notable for the following components:    POC Glucose 197 (*)     All other components within normal limits   PROTHROMBIN TIME (PT) - Normal   PTT, ACTIVATED - Normal   TROPONIN I HIGH SENSITIVITY - Normal   CBC WITH DIFFERENTIAL WITH PLATELET    Narrative:     The following orders were created for panel order CBC With Differential With Platelet.  Procedure                               Abnormality         Status                     ---------                               -----------         ------                     CBC W/ DIFFERENTIAL[[id number]]                              Final result                 Please view results  for these tests on the individual orders.   RAINBOW DRAW LAVENDER   RAINBOW DRAW LIGHT GREEN   RAINBOW DRAW BLUE   CBC W/ DIFFERENTIAL     EKG    Rate, intervals and axes as noted on EKG Report.  Rate: 76  Rhythm: Sinus Rhythm  Reading: Old right bundle branch block, no new ST-T wave abnormality            MRI BRAIN MRA HEAD+MRA NECK (ALL W+WO) (CPT=70553/39358/38721)    Result Date: 4/19/2024  PROCEDURE:  MRI BRAIN MRA HEAD+MRA NECK (ALL W+WO) (CPT=70553/02244/89640)  COMPARISON:  None.  INDICATIONS:  eval CVA  TECHNIQUE:  MRI of the brain was performed with multi-planar T1, T2-weighted images with FLAIR sequences and diffusion weighted images without and with infusion.  MR angiography of the brain without and with infusion and MR angiography of the neck without and with infusion was performed using 3D time of flight, multi-planar and 3D reconstructed images. All measurements obtained in this exam were performed using NASCET criteria.  PATIENT STATED HISTORY:(As transcribed by Technologist)  Right sided body numbness and weakness.   CONTRAST USED:  20 mL of Dotarem  FINDINGS:   Recent subacute infarct with restricted diffusion as well as increased signal on T2/FLAIR imaging indicating the subacute nature of the infarct located within the left anterolateral inferior thalamus measuring 9 mm.  No other restricted diffusion.  No midline shift mass effect herniation hydrocephalus.  Only mild chronic ischemic white matter changes.  Mild cerebral atrophy.   No pathologic gradient susceptibility. Flow-voids are present within the right and left internal carotid arteries, the basilar artery, and right and left distal vertebral arteries   MRA  Extracranial MRA of the neck shows patency of the right and left vertebral arteries, the right and left common carotid, internal carotid and external carotid arteries and carotid bulb without sign of dissection, occlusion or acute thrombosis of these vessels. No hemodynamically  significant stenosis is identified   Intracranial MRA of the intracranial circulation shows patency of the distal right and left vertebral arteries, the distal right and left cervical ICA, the intracranial ICA, and the anterior, middle, and posterior cerebral arteries bilaterally.  The basilar artery is also patent.  Focal stenosis seen involving the proximal right greater than left posterior cerebral arteries No flow limiting stenosis identified involving the right or left anterior, middle cerebral arteries.            CONCLUSION:   Recent subacute infarct with a subcentimeter focus of restricted diffusion, but also showing increased T2/FLAIR signal indicating subacute nature of the infarct within the anterolateral inferior left thalamus.  Focal stenosis identified involving both right and left proximal posterior cerebral arteries.   LOCATION:  [facility]   Dictated by (CST): Bonifacio Herrera MD on 4/19/2024 at 10:29 PM     Finalized by (CST): Bonifacio Herrera MD on 4/19/2024 at 10:37 PM       XR CHEST AP PORTABLE  (CPT=71045)    Result Date: 4/19/2024  PROCEDURE:  XR CHEST AP PORTABLE  (CPT=71045)  TECHNIQUE:  AP chest radiograph was obtained.  COMPARISON:  [facility], XR, XR CHEST PA + LAT CHEST (QDM=[id number]), 10/10/2016, 11:00 AM.  INDICATIONS:  numbness in Right leg  PATIENT STATED HISTORY: (As transcribed by Technologist)  Patient offered no additional history at this time.              CONCLUSION:    Poor inspiration low lung volumes.  Cardiomegaly.  Left lower lobe patchy atelectasis or infiltrate. No sizable effusion, but small effusion difficult to exclude on portable chest x-ray. No evidence for pneumothorax.  Consider upright PA and lateral examination of the chest, when tolerated.  LOCATION:  [facility]      Dictated by (CST): Bonifacio Herrera MD on 4/19/2024 at 5:44 PM     Finalized by (CST): Bonifacio Herrera MD on 4/19/2024 at 5:44 PM           XR CHEST AP PORTABLE  (CPT=71045)    Result Date: 4/19/2024  PROCEDURE:   XR CHEST AP PORTABLE  (CPT=71045)  TECHNIQUE:  AP chest radiograph was obtained.  COMPARISON:  [facility], XR, XR CHEST PA + LAT CHEST (VPI=[id number]), 10/10/2016, 11:00 AM.  INDICATIONS:  numbness in Right leg  PATIENT STATED HISTORY: (As transcribed by Technologist)  Patient offered no additional history at this time.              CONCLUSION:    Poor inspiration low lung volumes.  Cardiomegaly.  Left lower lobe patchy atelectasis or infiltrate. No sizable effusion, but small effusion difficult to exclude on portable chest x-ray. No evidence for pneumothorax.  Consider upright PA and lateral examination of the chest, when tolerated.  LOCATION:  [facility]      Dictated by (CST): Bonifacio Herrera MD on 4/19/2024 at 5:44 PM     Finalized by (CST): Bonifacio Herrera MD on 4/19/2024 at 5:44 PM               [facility]      Medical Decision Making:    Differential diagnosis before testing includes CVA, electrolyte abnormality potential life threatening diagnosis which is a medical condition that poses a threat to life/function.     Comorbidities that add complexity to management: See HPI    I reviewed prior external ED notes including prior annual physical exam 10/23 diabetes    Discussions of management with: Neurology, Dr. Naidu      I personally reviewed the radiographs and my independent interpretation includes no lung consolidations, no pneumothorax.     Shared decision making:  Admission disposition: 4/19/2024 10:56 PM         MRI MRA brain subacute nature of the infarct within the anterolateral inferior left thalamus.  Discussed with neurology, recommended baby aspirin, Plavix and atorvastatin and admission to hospitalist.  Explained results and plan to patient.  He understands and agrees with plan.    Patient out of window for any stroke treatment.  He came to ED significantly over 24 hours from onset of symptoms.                           Medical Decision Making      Disposition and Plan     Clinical Impression:  1. Acute  CVA (cerebrovascular accident) (HCC)         Disposition:  Admit  4/19/2024 10:56 pm    Follow-up:  No follow-up provider specified.        Medications Prescribed:  Current Discharge Medication List                            Hospital Problems       Present on Admission  Date Reviewed: 12/23/2019   None

## 2024-04-20 NOTE — SLP NOTE
ADULT SWALLOWING EVALUATION    ASSESSMENT      PERTINENT BACKGROUND INFORMATION:  Patient is a 74 year-old male who was admitted on 4/19/24 with Acute CVA (cerebrovascular accident) (HCC) [I63.9].  Imaging results reviewed and noted below.  Currently on  RA with adequate SPO2 saturations of 96%. Baseline RR 17 and HR 83 was noted.  Patient resides at home.   Prior to this hospitalization, patient was consuming a regular diet with thin liquids. Patient indicates chief complaint of R side weakness.  Denied speech, word finding or swallow deficits.  Passed  RN swallow screening. Patient is currently on a reg diet (carb controlled) with thin liquids.    ASSESSMENT:   Patient was sleeping upon arrival. He was noted to shake/tremor R UE when awoken for about 5+ seconds in which RN is aware.  Able to maintain midline position with adequate trunk and head control when upright in bed. Reduced timeliness of his responses and needed increased time or questions/statements to be repeated or restated. He needed to look at white board when asked month/year.  Able to state correct current and past president.  He stated \"office\" at first when asked of his surroundings, then self-corrected to hospital but was unable to recall name of the hospital.  A slight/minimal dysarthria detected. Oral motor mechanism exam was remarkable for inconsistent R facial asymmetry. Patient presented with WNL oropharyngeal skills with absent signs/symptoms of aspiration during the controlled conditions of this exam and when swallow mechanism was challenged via consecutive swallow of thin liquid by straw.  Please see below objective section for details. No further follow-up for swallow skills warranted. Will plan on cognitive-communication evaluation.     RECOMMENDATIONS   Diet Recommendations - Solids: Regular  Diet Recommendations - Liquids: Thin Liquids    Aspiration Precautions: Upright position;Slow rate;Small bites and sips  Medication  Administration Recommendations: No restrictions  Treatment Plan/Recommendations: Communication evaluation    RECOMMENDATIONS/PLAN:  -  Recommend continue current diet with implementation of  swallow support strategies outlined above.   - Cog-com evaluation to be completed per stroke protocol.       EDUCATION:  - Patient verbalized understanding to results and recommendations of this evaluation and was in agreement (no family present)   - Spoke with CHELY Fernandez.        HISTORY   MEDICAL HISTORY  Reason for Referral: Stroke protocol    Problem List  Principal Problem:    Acute CVA (cerebrovascular accident) (HCC)      Past Medical History  Past Medical History:    Diabetes mellitus (HCC)    Type II or unspecified type diabetes mellitus without mention of complication, not stated as uncontrolled       Prior Living Situation: Home with spouse  Diet Prior to Admission: Regular;Thin liquids  Precautions: None    Patient/Family Goals: not stated    SWALLOWING HISTORY  Current Diet Consistency: Regular;Thin liquids (carb controlled)  Dysphagia History: none    IMAGING  MRI BRAIN MRA HEAD+MRA NECK (ALL W+WO) CONCLUSION:  Recent subacute infarct with a subcentimeter focus of restricted diffusion, but also showing increased T2/FLAIR signal indicating subacute nature of the infarct within the anterolateral inferior left thalamus.  Focal stenosis identified involving both   right and left proximal posterior cerebral arteries.      LOCATION:  [facility]      Dictated by (CST): Bonifacio Herrera MD on 4/19/2024 at 10:29 PM       Finalized by (CST): Bonifacio Herrera MD on 4/19/2024 at 10:37 PM   XR CHEST AP PORTABLE CONCLUSION:   Poor inspiration low lung volumes.  Cardiomegaly.  Left lower lobe patchy atelectasis or infiltrate. No sizable effusion, but small effusion difficult to exclude on portable chest x-ray. No evidence for pneumothorax.  Consider upright PA   and lateral examination of the chest, when tolerated.      LOCATION:   [facility]      Dictated by (CST): Bonifacio Herrera MD on 4/19/2024 at 5:44 PM       Finalized by (CST): Bonifacio Herrera MD on 4/19/2024 at 5:44 PM        OBJECTIVE   ORAL MOTOR EXAMINATION  Dentition: Natural  Symmetry: Reduced right facial  Strength: Within Functional Limits     Range of Motion: Within Functional Limits  Rate of Motion: Reduced    Voice Quality: Clear  Respiratory Status: Unlabored  Consistencies Trialed: Thin liquids;Puree;Hard solid  Method of Presentation: Self presentation;Staff/Clinician assistance;Spoon;Cup;Straw;Single sips;Consecutive swallows  Patient Positioning: Upright;Midline    Oral Phase of Swallow: Within Functional Limits                      Pharyngeal Phase of Swallow: Within Functional Limits           (Please note: Silent aspiration cannot be evaluated clinically. Videofluoroscopic Swallow Study is required to rule-out silent aspiration.)    Esophageal Phase of Swallow: No complaints consistent with possible esophageal involvement  Comments:               GOALS  Goal #1 Cognitive-communication evaluation  Goal Established         Goal #2     Goal #3     Goal #4     Goal #5     Goal #6     Goal #7     Goal #8       FOLLOW UP  Treatment Plan/Recommendations: Communication evaluation  Number of Visits to Meet Established Goals: 1  Follow Up Needed (Documentation Required): Yes  SLP Follow-up Date: 04/21/24    Thank you for your referral.   If you have any questions, please contact GRACIELA Lopez

## 2024-04-20 NOTE — PLAN OF CARE
Assumed patient care 0730  Patient drowsy but easily arousable, oriented X4  On room air, BP elevated within parameters, NSR with 1AVB and BBB on tele   Denies pain  Q4 neuro, no new deficits, see flowsheets   Patient is 1-2 max assist with SS lift to bedside commode and chair  Voiding per urinal, commode, 1 BM this shift  Tolerating diet   Patient educated on need for blood sugar checks, refusing hospital lancets, MD notified, okay to check BS using patients lancet per Cesar   PT recommending intensive therapy   SLP recommending regular/thin   Patient updated on plan of care     Problem: NEUROLOGICAL - ADULT  Goal: Achieves stable or improved neurological status  Description: INTERVENTIONS  - Assess for and report changes in neurological status  - Initiate measures to prevent increased intracranial pressure  - Maintain blood pressure and fluid volume within ordered parameters to optimize cerebral perfusion and minimize risk of hemorrhage  - Monitor temperature, glucose, and sodium. Initiate appropriate interventions as ordered  Outcome: Progressing  Goal: Absence of seizures  Description: INTERVENTIONS  - Monitor for seizure activity  - Administer anti-seizure medications as ordered  - Monitor neurological status  Outcome: Progressing  Goal: Remains free of injury related to seizure activity  Description: INTERVENTIONS:  - Maintain airway, patient safety  and administer oxygen as ordered  - Monitor patient for seizure activity, document and report duration and description of seizure to MD/LIP  - If seizure occurs, turn patient to side and suction secretions as needed  - Reorient patient post seizure  - Seizure pads on all 4 side rails  - Instruct patient/family to notify RN of any seizure activity  - Instruct patient/family to call for assistance with activity based on assessment  Outcome: Progressing  Goal: Achieves maximal functionality and self care  Description: INTERVENTIONS  - Monitor swallowing and  airway patency with patient fatigue and changes in neurological status  - Encourage and assist patient to increase activity and self care with guidance from PT/OT  - Encourage visually impaired, hearing impaired and aphasic patients to use assistive/communication devices  Outcome: Progressing

## 2024-04-20 NOTE — ED QUICK NOTES
Orders for admission, patient is aware of plan and ready to go upstairs. Any questions, please call ED RN Elvis RN  at extension [phone].     Patient Covid vaccination status: Unvaccinated     COVID Test Ordered in ED: None    COVID Suspicion at Admission: N/A    Running Infusions:  None    Mental Status/LOC at time of transport: a/o x 4    Other pertinent information:   CIWA score: N/A   NIH score:  3

## 2024-04-20 NOTE — PROGRESS NOTES
NURSING ADMISSION NOTE      Patient admitted via Cart  Oriented to room.  Safety precautions initiated.  Bed in low position.  Call light in reach.    Pt admitted from ED to room 7624. Pt a/o x 4.   Denies any pain or discomfort. No c/o lightheadedness/dizziness.   Neuro assessment significant for rt side weakness.  VSS.Afebrile.NSR on tele.Remains on RA w/ O2 sats >92%.   Pt and family updated on poc.Fall precautions initiated.   Will cont to monitor.    Pt refusing accu checks. He wants blood to be taken from his arm and not fingertips.  Hospitalist notified,Instructed not to use arm for accu cheks.  Pt made aware . Cont to refuse blood sugar checks this am.

## 2024-04-20 NOTE — CONSULTS
[facility]   [facility]Y   CONSULT NOTE    Admission date: 4/19/2024  Reason for Consult: Acute stroke.  Chief Complaint:   Chief Complaint   Patient presents with    Numbness Weakness   ________________________________________________________________    History     History of Presenting Illness  74 year old male with diabetes presented with 1 day history of right-sided weakness.  He was increasingly noting right lower extremity numbness and decided to come to the hospital.  In the emergency department patient had an MRI of the brain which reported left inferior thalamus infarct and patient was admitted.  Patient currently feels that he still has right-sided weakness which is mild and still feels loss of sensation in the right lower extremity.  Denies diplopia, dysarthria, dysphagia, confusion, disorientation or loss of consciousness.    History obtained from patient and chart review.    Past Medical History:    Diabetes mellitus (HCC)    Type II or unspecified type diabetes mellitus without mention of complication, not stated as uncontrolled     Past Surgical History:   Procedure Laterality Date    Colonoscopy      2005    Tonsillectomy       Social History     Socioeconomic History    Marital status:    Tobacco Use    Smoking status: Never    Smokeless tobacco: Never   Vaping Use    Vaping status: Never Used   Substance and Sexual Activity    Alcohol use: No    Drug use: No     Social Determinants of Health     Food Insecurity: No Food Insecurity (4/20/2024)    Food Insecurity     Food Insecurity: Never true   Transportation Needs: No Transportation Needs (4/20/2024)    Transportation Needs     Lack of Transportation: No    [facility]    Social Connections   Housing Stability: Low Risk  (4/20/2024)    Housing Stability     Housing Instability: No     Family History   Problem Relation Age of Onset    Ear Problems Father         hearing loss    Bleeding  Disorders Sister     Cancer Paternal Grandfather         lung    Cancer Paternal Uncle         lung     Allergies No Known Allergies    Home Meds  Current Outpatient Medications   Medication Instructions    cholecalciferol 5,000 Units, Oral, Daily    Glucose Blood (FREESTYLE LITE TEST) In Vitro Strip USE 8 TIMES A DAY AS DIRECTED    Glucose Blood (FREESTYLE LITE TEST) In Vitro Strip USE 8 TIMES DAILY AS DIRECTED    HUMULIN R U-500 KWIKPEN 500 UNIT/ML Subcutaneous Solution Pen-injector No dose, route, or frequency recorded.    Hydrocortisone-Acetic Acid 1-2 % Otic Solution 4 drops, Left Ear, 2 times daily, For 10 days    Magnesium Gluconate 200 mg, Oral, Daily    Vitamin K, Phytonadione, 100 MCG Oral Tab Oral     Scheduled Meds:   aspirin  81 mg Oral Daily    clopidogrel  75 mg Oral Daily    heparin  5,000 Units Subcutaneous Q8H JUAN JOSE    atorvastatin  80 mg Oral Nightly    insulin degludec  30 Units Subcutaneous Daily     Continuous Infusions:   sodium chloride 75 mL/hr at 04/20/24 1330     PRN Meds:  acetaminophen **OR** acetaminophen    labetalol    hydrALAzine    ondansetron    metoclopramide    glucose **OR** glucose **OR** glucose-vitamin C **OR** dextrose **OR** glucose **OR** glucose **OR** glucose-vitamin C    acetaminophen    OBJECTIVE   VITAL SIGNS:   Temp:  [97.4 °F (36.3 °C)-98.6 °F (37 °C)] 98.5 °F (36.9 °C)  Pulse:  [58-75] 68  Resp:  [15-23] 15  BP: (141-196)/(76-99) 150/93  SpO2:  [94 %-97 %] 96 %    PHYSICAL EXAM:    NEUROLOGIC:    Mental Status:  A&O x 4, Follows simple commands, no obvious aphasia or dysarthria  Cranial nerves: PERRL.  Visual fields full.  EOMI. very mild right nasolabial fold flattening.  Hearing grossly intact. Tongue midline with normal movements.   Motor: Mild right plantar drift noted; Motor exam is 5 out of 5 in all extremities bilaterally  Sensation: Intact to light touch bilaterally  Cerebellar: Normal Finger-To-Nose test      LABORATORY DATA:  Last 24 hour labs were reviewed  in detail.  Recent Labs   Lab 04/19/24  1726      K 4.1      CO2 22.0   *   BUN 33*   CREATSERUM 1.85*     Recent Labs   Lab 04/19/24  1726   WBC 9.0   HGB 13.4   .0     Recent Labs   Lab 04/19/24  1726   ALT 23   AST 25     No results for input(s): \"MG\", \"PHOS\" in the last 168 hours.  Last A1c value was 12.1% done 4/19/2024.     Lab Results   Component Value Date     04/20/2024    HDL 56 04/20/2024    TRIG 101 04/20/2024    VLDL 18 04/20/2024        Radiology:    MRI BRAIN MRA HEAD+MRA NECK (ALL W+WO) (CPT=70553/87341/94706)    Result Date: 4/19/2024  CONCLUSION:   Recent subacute infarct with a subcentimeter focus of restricted diffusion, but also showing increased T2/FLAIR signal indicating subacute nature of the infarct within the anterolateral inferior left thalamus.  Focal stenosis identified involving both right and left proximal posterior cerebral arteries.   LOCATION:  [facility]   Dictated by (CST): Bonifacio Herrera MD on 4/19/2024 at 10:29 PM     Finalized by (CST): Bonifacio Herrera MD on 4/19/2024 at 10:37 PM       XR CHEST AP PORTABLE  (CPT=71045)    Result Date: 4/19/2024  CONCLUSION:    Poor inspiration low lung volumes.  Cardiomegaly.  Left lower lobe patchy atelectasis or infiltrate. No sizable effusion, but small effusion difficult to exclude on portable chest x-ray. No evidence for pneumothorax.  Consider upright PA and lateral examination of the chest, when tolerated.  LOCATION:  [facility]      Dictated by (CST): Bonifacio Herrera MD on 4/19/2024 at 5:44 PM     Finalized by (CST): Bonifacio Herrera MD on 4/19/2024 at 5:44 PM      ASSESSMENT/PLAN   74 year old male with:    Acute left inferior thalamus infarct, as noted on the MRI.  MRA of the head and neck reported focal, noncritical stenosis of bilateral PCAs.  Echocardiogram did not show any intracardiac thrombi or shunting.  Hemoglobin A1c was 12.1, .  Patient's symptoms are at least 2 days old.  Advised OT/PT/rehab  eval.  Patient to be started on dual antiplatelet and atorvastatin.  Bilateral focal PCA stenosis.  Patient to continue optimal medical management with dual antiplatelet and statin.  Patient will follow-up with neurology after discharge.  Will also schedule a follow-up for neurointervention team to review as outpatient.  Advised 30-day event monitoring for evaluation of occult atrial fibrillation.  Poorly controlled diabetes.  Patient was counseled regarding significantly elevated hemoglobin A1c and better control of diabetes.        Principal Problem:    Acute CVA (cerebrovascular accident) (HCC)       Phillip Masters MD  Neurohospitalist  [facility]    Disclaimer: This record was dictated using Dragon software. There may be errors due to voice recognition problems that were not realized and corrected during the completion of the note.

## 2024-04-20 NOTE — H&P
[facility] History and Physical      Chief Complaint   Patient presents with    Numbness Weakness        PCP: Jean Lunsford MD      History of Present Illness: Patient is a 74 year old male with PMH sig for DM2, presents to ER for eval R arm and L weakness and numbness.  Since started thursday am.  Awoke w sx.  Denies issues w speech, vision,.  No  prior ho cva.      Past Medical History:    Diabetes mellitus (HCC)    Type II or unspecified type diabetes mellitus without mention of complication, not stated as uncontrolled      Past Surgical History:   Procedure Laterality Date    Colonoscopy      2005    Tonsillectomy          ALL:  No Known Allergies     No current outpatient medications on file.       Social History     Tobacco Use    Smoking status: Never    Smokeless tobacco: Never   Substance Use Topics    Alcohol use: No        Fam Hx  Family History   Problem Relation Age of Onset    Ear Problems Father         hearing loss    Bleeding Disorders Sister     Cancer Paternal Grandfather         lung    Cancer Paternal Uncle         lung       Review of Systems  Comprehensive ROS reviewed and negative except for what is stated in HPI.      OBJECTIVE:  /79 (BP Location: Right arm)   Pulse 73   Temp 97.6 °F (36.4 °C) (Oral)   Resp 17   Wt 215 lb (97.5 kg)   SpO2 95%   BMI 30.85 kg/m²   General:  Alert, no distress, appears stated age.    Head:  Normocephalic, without obvious abnormality, atraumatic.   Eyes:  Sclera anicteric, No conjunctival pallor, EOMs intact.    Nose: Nares normal. Septum midline. Mucosa normal. No drainage.   Throat: Lips, mucosa, and tongue normal. Teeth and gums normal.   Neck: Supple, symmetrical, trachea midline, no cervical or supraclavicular lymph adenopathy, thyroid: no enlargment/tenderness/nodules appreciated   Lungs:   Clear to auscultation bilaterally. Normal effort   Chest wall:  No tenderness or deformity.   Heart:  Regular rate and rhythm, S1, S2 normal, no  murmur, rub or gallop appreciated   Abdomen:   Soft, non-tender. Bowel sounds normal. No masses,  No organomegaly. Non distended   Extremities: Extremities normal, atraumatic, no cyanosis or edema.   Skin: Skin color, texture, turgor normal. No rashes or lesions.    Neurologic: Normal strength, no focal deficit appreciated     Data Review:    LABS:   Lab Results   Component Value Date    WBC 9.0 2024    HGB 13.4 2024    HCT 40.9 2024    .0 2024    CREATSERUM 1.85 2024    BUN 33 2024     2024    K 4.1 2024     2024    CO2 22.0 2024     2024    CA 8.9 2024    ALB 3.6 2024    ALKPHO 99 2024    BILT 0.4 2024    TP 7.3 2024    AST 25 2024    ALT 23 2024    PTT 25.4 2024    INR 1.00 2024    PTP 13.2 2024    PGLU 197 2024       CXR: image personally reviewed.      Radiology: CARD ECHO 2D DOPPLER (CPT=93306)    Result Date: 2024  Transthoracic Echocardiogram Name:Doug Johnson Date: 2024 :  1950 Ht:  (70in)  BP: 159 / 87 MRN:  [id number]    Age:  74years    Wt:  (215lb) HR: 79bpm Loc:  [facility]       Gndr: M          BSA: 2.15m^2 Sonographer: Margy SERVIN, [facility] Ordering:    Gris Rose Consulting:  Phillip Masters ---------------------------------------------------------------------------- History/Indications:   Cerebrovascular accident. Numbness in right leg. Risk factors:  Diabetes mellitus. ---------------------------------------------------------------------------- Procedure information:  A transthoracic complete 2D study was performed. Additional evaluation included M-mode, complete spectral Doppler, and color Doppler.  Patient status:  Inpatient.  Location:  [place] [address].    No prior study was available for comparison.    This was a routine study. Transthoracic echocardiography for ventricular function evaluation and assessment of valvular  function. Image quality was adequate. ECG rhythm:   Normal sinus ---------------------------------------------------------------------------- Conclusions: 1. Left ventricle: The cavity size was normal. Wall thickness was normal.    Systolic function was normal. The estimated ejection fraction was 60-65%,    by visual assessment. No diagnostic evidence for regional wall motion    abnormalities. Unable to assess LV diastolic function. 2. Right ventricle: The cavity size was normal. Systolic function was    normal. 3. Aortic valve: There was mild regurgitation. 4. Aortic root: The aortic root was 4.2cm diameter. 5. Ascending aorta: The ascending aorta was 4.4cm diameter. 6. Pericardium, extracardiac: There was no pericardial effusion. Impressions:  No previous study from [facility] was available for comparison. * ---------------------------------------------------------------------------- * Findings: Left ventricle:  The cavity size was normal. Wall thickness was normal. Systolic function was normal. The estimated ejection fraction was 60-65%, by visual assessment. No diagnostic evidence for regional wall motion abnormalities. Unable to assess LV diastolic function. Left atrium:  The atrium was normal in size. The left atrial volume was normal. Right ventricle:  The cavity size was normal. Systolic function was normal. Right atrium:  The atrium was normal in size. Mitral valve:  The leaflets were mildly calcified. Leaflet separation was normal.  Doppler:  Transvalvular velocity was within the normal range. There was no evidence for stenosis. There was trivial regurgitation. Aortic valve:  The valve was structurally normal. The valve was trileaflet. Cusp separation was normal.  Doppler:  Transvalvular velocity was within the normal range. There was no evidence for stenosis. There was mild regurgitation. Tricuspid valve:  The valve is structurally normal. Leaflet separation was normal.  Doppler:   Transvalvular velocity was within the normal range. There was no evidence for stenosis. There was trivial regurgitation. Pulmonic valve:   Not well visualized.  Doppler:  Transvalvular velocity was within the normal range. There was no evidence for stenosis. There was trivial regurgitation. Pericardium:   There was no pericardial effusion. Aorta: Aortic root: The aortic root was 4.2cm diameter. Ascending aorta: The ascending aorta was 4.4cm diameter. Pulmonary arteries: Systolic pressure could not be accurately estimated. Systemic veins:  Central venous respirophasic diameter changes are in the normal range (>50%). Inferior vena cava: The IVC was normal-sized. ---------------------------------------------------------------------------- Measurements  Left ventricle                  Value        Ref  IVS thickness, ED, PLAX         1.0   cm     0.6 - 1.0  LV ID, ED, PLAX                 4.6   cm     4.2 - 5.8  LV ID, ES, PLAX                 3.0   cm     2.5 - 4.0  LV PW thickness, ED, PLAX   (H) 1.1   cm     0.6 - 1.0  IVS/LV PW ratio, ED, PLAX       0.96         ---------  LV PW/LV ID ratio, ED, PLAX     0.23         ---------  LV ejection fraction            64    %      52 - 72  LV e', lateral              (L) 7.8   cm/sec >=10.0  LV E/e', lateral                8            <=13  LV e', medial               (L) 6.3   cm/sec >=7.0  LV E/e', medial                 10           ---------  LV e', average                  7.1   cm/sec ---------  LV E/e', average                9            <=14  Aortic root                     Value        Ref  Aortic root ID, ED              4.2   cm     2.8 - 4.2  Ascending aorta                 Value        Ref  Ascending aorta ID, A-P, ED (H) 4.4   cm     2.2 - 3.8  Left atrium                     Value        Ref  LA ID, A-P, ES                  3.5   cm     3.0 - 4.0  LA volume, S                    42    ml     18 - 58  LA volume/bsa, S                20    ml/m^2 16 - 34  LA  volume, ES, 1-p A4C          42    ml     18 - 58  LA volume, ES, 1-p A2C          39    ml     18 - 58  LA volume, ES, A/L              45    ml     ---------  LA volume/bsa, ES, A/L          21    ml/m^2 16 - 34  LA/aortic root ratio            0.83         ---------  Mitral valve                    Value        Ref  Mitral E-wave peak velocity     0.63  m/sec  ---------  Mitral A-wave peak velocity     1.01  m/sec  ---------  Mitral E/A ratio, peak          0.6          ---------  Right ventricle                 Value        Ref  TAPSE, 2D                       1.78  cm     >=1.70 Legend: (L)  and  (H)  puma values outside specified reference range. ---------------------------------------------------------------------------- Prepared and electronically signed by Roberto Kim MD 04/20/2024 10:40     MRI BRAIN MRA HEAD+MRA NECK (ALL W+WO) (CPT=70553/01945/14694)    Result Date: 4/19/2024  PROCEDURE:  MRI BRAIN MRA HEAD+MRA NECK (ALL W+WO) (CPT=70553/11356/81761)  COMPARISON:  None.  INDICATIONS:  eval CVA  TECHNIQUE:  MRI of the brain was performed with multi-planar T1, T2-weighted images with FLAIR sequences and diffusion weighted images without and with infusion.  MR angiography of the brain without and with infusion and MR angiography of the neck without and with infusion was performed using 3D time of flight, multi-planar and 3D reconstructed images. All measurements obtained in this exam were performed using NASCET criteria.  PATIENT STATED HISTORY:(As transcribed by Technologist)  Right sided body numbness and weakness.   CONTRAST USED:  20 mL of Dotarem  FINDINGS:   Recent subacute infarct with restricted diffusion as well as increased signal on T2/FLAIR imaging indicating the subacute nature of the infarct located within the left anterolateral inferior thalamus measuring 9 mm.  No other restricted diffusion.  No midline shift mass effect herniation hydrocephalus.  Only mild chronic ischemic white matter  changes.  Mild cerebral atrophy.   No pathologic gradient susceptibility. Flow-voids are present within the right and left internal carotid arteries, the basilar artery, and right and left distal vertebral arteries   MRA  Extracranial MRA of the neck shows patency of the right and left vertebral arteries, the right and left common carotid, internal carotid and external carotid arteries and carotid bulb without sign of dissection, occlusion or acute thrombosis of these vessels. No hemodynamically significant stenosis is identified   Intracranial MRA of the intracranial circulation shows patency of the distal right and left vertebral arteries, the distal right and left cervical ICA, the intracranial ICA, and the anterior, middle, and posterior cerebral arteries bilaterally.  The basilar artery is also patent.  Focal stenosis seen involving the proximal right greater than left posterior cerebral arteries No flow limiting stenosis identified involving the right or left anterior, middle cerebral arteries.            CONCLUSION:   Recent subacute infarct with a subcentimeter focus of restricted diffusion, but also showing increased T2/FLAIR signal indicating subacute nature of the infarct within the anterolateral inferior left thalamus.  Focal stenosis identified involving both right and left proximal posterior cerebral arteries.   LOCATION:  [facility]   Dictated by (CST): Bonifacio Herrera MD on 4/19/2024 at 10:29 PM     Finalized by (CST): Bonifacio Herrera MD on 4/19/2024 at 10:37 PM       XR CHEST AP PORTABLE  (CPT=71045)    Result Date: 4/19/2024  PROCEDURE:  XR CHEST AP PORTABLE  (CPT=71045)  TECHNIQUE:  AP chest radiograph was obtained.  COMPARISON:  [facility], XR, XR CHEST PA + LAT CHEST (RVL=80314), 10/10/2016, 11:00 AM.  INDICATIONS:  numbness in Right leg  PATIENT STATED HISTORY: (As transcribed by Technologist)  Patient offered no additional history at this time.              CONCLUSION:    Poor inspiration low lung  volumes.  Cardiomegaly.  Left lower lobe patchy atelectasis or infiltrate. No sizable effusion, but small effusion difficult to exclude on portable chest x-ray. No evidence for pneumothorax.  Consider upright PA and lateral examination of the chest, when tolerated.  LOCATION:  [facility]      Dictated by (CST): Bonifacio Herrera MD on 4/19/2024 at 5:44 PM     Finalized by (CST): Bonifacio Herrera MD on 4/19/2024 at 5:44 PM          Assessment/Plan:     # acute/subactue CVA anterolateral inferior L thalamus  # focal stenosis of R and L posterior cerebral arteries  - CVA order set, permissive HTN  - asa, plavix, statin  - await ECHO  - seen by speech  - PT/OT  - await neuro eval    # DM2  - Hold oral hypoglycemics.    - Start SSI with qid accuchecks      Outpatient records or previous hospital records reviewed.   [facility] hospitalist to continue to follow patient while in house  A total of 75  minutes taken with patient and coordinating care.  Greater than 50% face to face encounter.      Duane Guerrero MD  [facility] Hospitalist  [phone]

## 2024-04-20 NOTE — PHYSICAL THERAPY NOTE
PHYSICAL THERAPY EVALUATION - INPATIENT     Room Number: 7624/7624-A  Evaluation Date: 4/20/2024  Type of Evaluation: Initial  Physician Order: PT Eval and Treat    Presenting Problem: CVA:L thalamus subacute infarc  Co-Morbidities : DVT  Reason for Therapy: Mobility Dysfunction and Discharge Planning    PHYSICAL THERAPY ASSESSMENT   Patient is currently functioning below baseline with bed mobility, transfers, gait, stair negotiation, maintaining seated position, standing prolonged periods, and performing household tasks.  Prior to admission, patient's baseline is ind in all aspect of her mobilities s any use of an A.D..  Patient is requiring maximum assist as a result of the following impairments: decreased functional strength, decreased endurance/aerobic capacity, impaired sitting/standing balance, impaired coordination, impaired motor planning, and decreased muscular endurance.  Physical Therapy will continue to follow for duration of hospitalization.    Patient will benefit from continued skilled PT Services to facilitate return to prior level of function as patient demonstrates high motivation with excellent tolerance to an intensive therapy program .    PLAN  PT Treatment Plan: Bed mobility;Body mechanics;Patient education;Gait training;Strengthening;Stair training;Transfer training;Balance training;Range of motion;Family education  Rehab Potential : Good  Frequency (Obs): 3-5x/week  Number of Visits to Meet Established Goals: 5      CURRENT GOALS    Goal #1 Patient is able to demonstrate supine - sit EOB @ level: minimum assistance     Goal #2 Patient is able to demonstrate transfers Sit to/from Stand at assistance level: minimum assistance     Goal #3 Patient is able to ambulate 15 feet with assist device: walker - rolling at assistance level: moderate assistance/min A     Goal #4 Pt will be ind/mod I in HEP for B LE while seated/supine     Goal #5    Goal #6    Goal Comments: Goals established on  4/20/2024      PHYSICAL THERAPY MEDICAL/SOCIAL HISTORY  History related to current admission: Patient is a 74 year old male admitted on 4/19/2024 from home for R side weakness. Pt diagnosed with Recent subacute infarct within the anterolateral inferior left thalamus.    HOME SITUATION  Type of Home: House   Home Layout: Two level  Stairs to Enter : 2     Stairs to Bedroom: 14       Lives With: Spouse (daughter and family lives in the basement apartment)  Drives: Yes        Prior Level of [place]: Ind in all aspect of mobilities s any use of an A.D.    SUBJECTIVE  Will I be able to walk again?      OBJECTIVE     Fall Risk: High fall risk    WEIGHT BEARING RESTRICTION  Weight Bearing Restriction: None        PAIN ASSESSMENT  Rating: Unable to rate          COGNITION  Overall Cognitive Status:  WFL - within functional limits    RANGE OF MOTION AND STRENGTH ASSESSMENT  Upper extremity ROM and strength are within functional limits except for the following:  R UE graded: shoulder: 3-/5, hand : 2/5    Lower extremity ROM is within functional limits     Lower extremity strength is within functional limits except for the following:   R LE graded 3-/5      BALANCE  Static Sitting: Good  Dynamic Sitting: Good  Static Standing: Fair  Dynamic Standing: Poor    ADDITIONAL TESTS        ACTIVITY TOLERANCE; Fair-        NEUROLOGICAL FINDINGS   [place] score 4/6    AM-PAC '6-Clicks' INPATIENT SHORT FORM - BASIC MOBILITY  How much difficulty does the patient currently have...  Patient Difficulty: Turning over in bed (including adjusting bedclothes, sheets and blankets)?: A Lot   Patient Difficulty: Sitting down on and standing up from a chair with arms (e.g., wheelchair, bedside commode, etc.): A Lot   Patient Difficulty: Moving from lying on back to sitting on the side of the bed?: A Lot   How much help from another person does the patient currently need...   Help from Another: Moving to and from a bed to a chair (including a  wheelchair)?: A Lot   Help from Another: Need to walk in hospital room?: Total   Help from Another: Climbing 3-5 steps with a railing?: Total       AM-PAC Score:  Raw Score: 10   Approx Degree of Impairment: 76.75%   Standardized Score (AM-PAC Scale): 32.29   CMS Modifier (G-Code): CL    FUNCTIONAL ABILITY STATUS  Gait Assessment        Skilled Therapy Provided     Bed Mobility:  Rolling: mod A  Supine to sit: mod   Sit to supine: NT     Transfer Mobility:  Sit to stand: mod A guided assist on the R UE. Difficulty placement on the R hand due to weakness   Stand to sit: mod A  Gait = NT    Therapist's Comments:   AAROM on the R LE with neurofacilitation  Sit<>stand with RW as support cueing on approp hand/leg placements and body mechanics  Standing balance and tolerance, weight shifting and posturing using STEDY with external assist on the R knee for control and facilitation  Left on the recliner and discussed with nursing to use STEDY at this time  Initially refused to get OOB and educated on the importance of mobilities and for his overall recovery  Addressed all issues and concerns. Nursing is aware of this visit.    Exercise/Education Provided:  Bed mobility  Body mechanics  Functional activity tolerated  Neuromuscular re-educate  Posture  Strengthening  Transfer training    Patient End of Session: Up in chair;Needs met;Call light within reach;RN aware of session/findings;All patient questions and concerns addressed      Patient Evaluation Complexity Level:  History Moderate - 1 or 2 personal factors and/or co-morbidities   Examination of body systems Moderate - addressing a total of 3 or more elements   Clinical Presentation Low - Stable   Clinical Decision Making Low - Stable       PT Session Time: 35 minutes  Gait Trainin minutes  Therapeutic Activity: 10 minutes  Neuromuscular Re-education: 20 minutes  Therapeutic Exercise: 5 minutes

## 2024-04-21 LAB
ANION GAP SERPL CALC-SCNC: 6 MMOL/L (ref 0–18)
BASOPHILS # BLD AUTO: 0.01 X10(3) UL (ref 0–0.2)
BASOPHILS NFR BLD AUTO: 0.1 %
BUN BLD-MCNC: 37 MG/DL (ref 9–23)
CALCIUM BLD-MCNC: 8.8 MG/DL (ref 8.5–10.1)
CHLORIDE SERPL-SCNC: 109 MMOL/L (ref 98–112)
CO2 SERPL-SCNC: 25 MMOL/L (ref 21–32)
CREAT BLD-MCNC: 1.76 MG/DL
EGFRCR SERPLBLD CKD-EPI 2021: 40 ML/MIN/1.73M2 (ref 60–?)
EOSINOPHIL # BLD AUTO: 0.18 X10(3) UL (ref 0–0.7)
EOSINOPHIL NFR BLD AUTO: 2.6 %
ERYTHROCYTE [DISTWIDTH] IN BLOOD BY AUTOMATED COUNT: 13.2 %
GLUCOSE BLD-MCNC: 230 MG/DL (ref 70–99)
GLUCOSE BLD-MCNC: 232 MG/DL (ref 70–99)
GLUCOSE BLD-MCNC: 235 MG/DL (ref 70–99)
GLUCOSE BLD-MCNC: 255 MG/DL (ref 70–99)
GLUCOSE BLD-MCNC: 256 MG/DL (ref 70–99)
HCT VFR BLD AUTO: 36.7 %
HGB BLD-MCNC: 12.4 G/DL
IMM GRANULOCYTES # BLD AUTO: 0.01 X10(3) UL (ref 0–1)
IMM GRANULOCYTES NFR BLD: 0.1 %
LYMPHOCYTES # BLD AUTO: 2.08 X10(3) UL (ref 1–4)
LYMPHOCYTES NFR BLD AUTO: 29.7 %
MCH RBC QN AUTO: 27.7 PG (ref 26–34)
MCHC RBC AUTO-ENTMCNC: 33.8 G/DL (ref 31–37)
MCV RBC AUTO: 81.9 FL
MONOCYTES # BLD AUTO: 0.71 X10(3) UL (ref 0.1–1)
MONOCYTES NFR BLD AUTO: 10.1 %
NEUTROPHILS # BLD AUTO: 4.02 X10 (3) UL (ref 1.5–7.7)
NEUTROPHILS # BLD AUTO: 4.02 X10(3) UL (ref 1.5–7.7)
NEUTROPHILS NFR BLD AUTO: 57.4 %
OSMOLALITY SERPL CALC.SUM OF ELEC: 307 MOSM/KG (ref 275–295)
PLATELET # BLD AUTO: 236 10(3)UL (ref 150–450)
POTASSIUM SERPL-SCNC: 3.8 MMOL/L (ref 3.5–5.1)
RBC # BLD AUTO: 4.48 X10(6)UL
SODIUM SERPL-SCNC: 140 MMOL/L (ref 136–145)
WBC # BLD AUTO: 7 X10(3) UL (ref 4–11)

## 2024-04-21 RX ORDER — INSULIN DEGLUDEC 100 U/ML
35 INJECTION, SOLUTION SUBCUTANEOUS DAILY
Status: DISCONTINUED | OUTPATIENT
Start: 2024-04-21 | End: 2024-04-26

## 2024-04-21 RX ORDER — POTASSIUM CHLORIDE 20 MEQ/1
40 TABLET, EXTENDED RELEASE ORAL ONCE
Status: COMPLETED | OUTPATIENT
Start: 2024-04-21 | End: 2024-04-21

## 2024-04-21 NOTE — PHYSICAL THERAPY NOTE
PHYSICAL THERAPY TREATMENT NOTE - INPATIENT    Room Number: 7624/7624-A     Session: 1     Number of Visits to Meet Established Goals: 5    Presenting Problem: CVA:L thalamus subacute infarc  Co-Morbidities : DVT    ASSESSMENT   Patient demonstrates good  progress this session, goals  remain in progress.    Patient continues to function below baseline with bed mobility, transfers, gait, and stair negotiation.  Contributing factors to remaining limitations include decreased functional strength, decreased endurance/aerobic capacity, impaired sitting and standing balance, impaired coordination, and impaired motor planning.  Next session anticipate patient to progress transfers and gait.  Physical Therapy will continue to follow patient for duration of hospitalization.    Patient continues to benefit from continued skilled PT services: to facilitate return to prior level of function as patient demonstrates high motivation with excellent tolerance to an intensive therapy program .    PLAN  PT Treatment Plan: Bed mobility;Body mechanics;Patient education;Gait training;Strengthening;Stair training;Transfer training;Balance training;Range of motion;Family education  Rehab Potential : Good  Frequency (Obs): 3-5x/week    CURRENT GOALS     Goal #1 Patient is able to demonstrate supine - sit EOB @ level: minimum assistance     Goal #2 Patient is able to demonstrate transfers Sit to/from Stand at assistance level: minimum assistance     Goal #3 Patient is able to ambulate 15 feet with assist device: walker - rolling at assistance level: moderate assistance/min A     Goal #4 Pt will be ind/mod I in HEP for B LE while seated/supine     Goal #5    Goal #6    Goal Comments: Goals established on 4/20/2024 4/21/2024 all goals ongoing.    SUBJECTIVE  \" I just don't have any control on my arm.\"    OBJECTIVE       WEIGHT BEARING RESTRICTION  Weight Bearing Restriction: None                PAIN ASSESSMENT   Rating: Unable to rate           BALANCE                                                                                                                       Static Sitting: Good  Dynamic Sitting: Good           Static Standing: Fair  Dynamic Standing: Poor +    ACTIVITY TOLERANCE                         O2 WALK         AM-PAC '6-Clicks' INPATIENT SHORT FORM - BASIC MOBILITY  How much difficulty does the patient currently have...  Patient Difficulty: Turning over in bed (including adjusting bedclothes, sheets and blankets)?: A Lot   Patient Difficulty: Sitting down on and standing up from a chair with arms (e.g., wheelchair, bedside commode, etc.): A Lot   Patient Difficulty: Moving from lying on back to sitting on the side of the bed?: A Lot   How much help from another person does the patient currently need...   Help from Another: Moving to and from a bed to a chair (including a wheelchair)?: A Lot   Help from Another: Need to walk in hospital room?: A Lot   Help from Another: Climbing 3-5 steps with a railing?: Total       AM-PAC Score:  Raw Score: 11   Approx Degree of Impairment: 72.57%   Standardized Score (AM-PAC Scale): 33.86   CMS Modifier (G-Code): CL    FUNCTIONAL ABILITY STATUS  Gait Assessment   Functional Mobility/Gait Assessment  Gait Assistance: Maximum assistance  Distance (ft): 10  Assistive Device: Rolling walker  Pattern:  (step to pattern, dec quad control, dec R hand  on the walker)    Skilled Therapy Provided    Bed Mobility:  Rolling: Nt   Supine<>Sit: NT   Sit<>Supine: NT     Transfer Mobility:  Sit<>Stand: mod assist of two. Repeated 5 times, each time quad support manually, cues for left ue on arm rest.    Stand<>Sit: cues for L UE placement, mod assist of two.    Gait: RW and mod assist of two. Patient shows poor  on RW with RUE, next session try alex walker.   During gait, cues for step sequence, RW management, manual cues for quad control on RLE to prevent knee buckling, cues for posture awareness, cues  and assist for wt shift. Chair follow needed.     Therapist's Comments: patient performed pre gait activity for 15 min prior to gait training. Facilitated wt shift side to side, ant<>post in standing, cues for midline orientation; quad set in standing; step forward/backward with LLE while R knee control with manual cues. STS repeated.       THERAPEUTIC EXERCISES  Lower Extremity AAROM ex     Upper Extremity AAROM of RUE     Position sitting     Repetitions   5-10   Sets   1     Patient End of Session: Up in chair;Needs met;Call light within reach;RN aware of session/findings;All patient questions and concerns addressed;Alarm set;Family present    PT Session Time: 38 minutes  Gait Training: 10 minutes  Therapeutic Activity: 15 minutes  Therapeutic Exercise: 3 minutes   Neuromuscular Re-education: 10 minutes

## 2024-04-21 NOTE — CONSULTS
Physical Medicine and Rehabilitation Remote Consult Note    Date of Consultation: 04/21/24  Consult Reason: Impaired mobility and activities of daily living dysfunction secondary to acute stroke  Requesting Attending: Braulio Guerrero MD    Prior documentation in the medical record was reviewed and summarized to formulate this remote consult.     DYANA Johnson is a 74 year old male with diabetes presented to the [facility] ED on 4/19/24 with a 1 day history of right-sided weakness.  He was increasingly noting right lower extremity numbness and decided to come to the hospital.  In the emergency department patient had an MRI of the brain which reported left inferior thalamus infarct and patient was admitted. Neurology was consulted.  MRA of the head and neck reported focal, noncritical stenosis of bilateral PCAs. Echocardiogram did not show any intracardiac thrombi or shunting. Hemoglobin A1c was 12.1, .  Advised 30-day event monitoring for evaluation of occult atrial fibrillation.  Started on DAPT and atorvastatin.     PM&R was consulted for rehab recommendations.       RELEVANT HISTORY:  Past Medical History:    Arrhythmia    Diabetes mellitus (HCC)    High blood pressure    Type II or unspecified type diabetes mellitus without mention of complication, not stated as uncontrolled       Past Surgical History:   Procedure Laterality Date    Colonoscopy      2005    Tonsillectomy         Family History   Problem Relation Age of Onset    Ear Problems Father         hearing loss    Bleeding Disorders Sister     Cancer Paternal Grandfather         lung    Cancer Paternal Uncle         lung       Social History  Home Setting: Lives With: Spouse (daughter and family lives in the basement apartment)   Family/Support:  Social History     Socioeconomic History    Marital status:    Tobacco Use    Smoking status: Never    Smokeless tobacco: Never   Vaping Use    Vaping status: Never Used   Substance and Sexual  Activity    Alcohol use: No    Drug use: No     Social Determinants of Health     Food Insecurity: No Food Insecurity (4/20/2024)    Food Insecurity     Food Insecurity: Never true   Transportation Needs: No Transportation Needs (4/20/2024)    Transportation Needs     Lack of Transportation: No    [facility]    Social Connections   Housing Stability: Low Risk  (4/20/2024)    Housing Stability     Housing Instability: No       FUNCTIONAL STATUS:     Premorbid:       Current:  PT documentation on 4/21/24:  Transfer Mobility:  Sit<>Stand: mod assist of two. Repeated 5 times, each time quad support manually, cues for left ue on arm rest.                 Stand<>Sit: cues for L UE placement, mod assist of two.    Gait: RW and mod assist of two. Patient shows poor  on RW with RUE, next session try alex walker.   During gait, cues for step sequence, RW management, manual cues for quad control on RLE to prevent knee buckling, cues for posture awareness, cues and assist for wt shift. Chair follow needed.     Gait Assessment   Functional Mobility/Gait Assessment  Gait Assistance: Maximum assistance  Distance (ft): 10  Assistive Device: Rolling walker  Pattern:  (step to pattern, dec quad control, dec R hand  on the walker)    OT documentation - pending      SLP documentation 4/20/24:    Diet Recommendations - Solids: Regular  Diet Recommendations - Liquids: Thin Liquids     Aspiration Precautions: Upright position;Slow rate;Small bites and sips  Medication Administration Recommendations: No restrictions  Treatment Plan/Recommendations: Communication evaluation     RECOMMENDATIONS/PLAN:  -  Recommend continue current diet with implementation of  swallow support strategies outlined above.   - Cog-com evaluation to be completed per stroke protocol.       ALLERGIES:    No Known Allergies            MEDICATIONS:   Medications reviewed    Current Outpatient Medications:     aspirin 81 MG  Oral Chew Tab, Chew 1 tablet (81 mg total) by mouth daily., Disp: 90 tablet, Rfl: 0    atorvastatin 80 MG Oral Tab, Take 1 tablet (80 mg total) by mouth nightly., Disp: 90 tablet, Rfl: 0    clopidogrel 75 MG Oral Tab, Take 1 tablet (75 mg total) by mouth daily., Disp: 90 tablet, Rfl: 0     No physical examination performed as this is a remote consult.     DATA:  Labs:  Recent Labs   Lab 04/19/24  1726 04/21/24  0632   RBC 4.96 4.48   HGB 13.4 12.4*   HCT 40.9 36.7*   MCV 82.5 81.9   MCH 27.0 27.7   MCHC 32.8 33.8   RDW 13.2 13.2   NEPRELIM 6.80 4.02   WBC 9.0 7.0   .0 236.0         Recent Labs   Lab 04/19/24  1726 04/21/24  0632   * 255*   BUN 33* 37*   CREATSERUM 1.85* 1.76*   EGFRCR 38* 40*   CA 8.9 8.8    140   K 4.1 3.8    109   CO2 22.0 25.0         Imaging:  MRI BRAIN MRA HEAD+MRA NECK   Result Date: 4/19/2024  CONCLUSION:   Recent subacute infarct with a subcentimeter focus of restricted diffusion, but also showing increased T2/FLAIR signal indicating subacute nature of the infarct within the anterolateral inferior left thalamus.  Focal stenosis identified involving both right and left proximal posterior cerebral arteries.     XR CHEST AP PORTABLE  Result Date: 4/19/2024  CONCLUSION:    Poor inspiration low lung volumes.  Cardiomegaly.  Left lower lobe patchy atelectasis or infiltrate. No sizable effusion, but small effusion difficult to exclude on portable chest x-ray. No evidence for pneumothorax.  Consider upright PA and lateral examination of the chest, when tolerated.      ASSESSMENT/RECOMMENDATIONS:                                      Rehab diagnosis: Impaired mobility and activities of daily living dysfunction secondary to acute stroke.    Recommended Disposition:   Patient has seen PT however still pending OT eval.   Will continue to follow until OT evaluation is completed with final recommendations to follow.  Recommend in person consultation tomorrow.  Also recommend  confirmation of home support with family tomorrow (will need to confirm patient has 24/7 physical support available on discharge).      Other Recommendations:  - Encourage out of bed to chair TID during meal times    - Encourage patient to transfer to commode or toilet for toileting   - Continue monitoring for constipation. If develops, consider scheduling bowel meds.   - Please have PT/OT/SLP continue to follow the patient while in house.     Thank you for the opportunity to be involved in the care of this patient. Please feel free to contact me via [employer] with questions or concerns. We will continue to follow with you regarding rehabilitation needs.    Veronica Berry MD  Physical Medicine and Rehabilitation

## 2024-04-21 NOTE — DISCHARGE PLANNING
Patient follow-up appointment information:     Visit Type: Stroke follow-up  Date of TIA/Stroke: 4/19/2024  Type of Stroke: Ischemic  Patient to follow-up: 4 weeks  OP Neurologist: Any available neurologist  New patient to neurology service: Yes  Anticipated discharge (if known): 4/22?  Discharge disposition (if known): Acute rehab      RN Stroke Navigator team  [phone]

## 2024-04-21 NOTE — PLAN OF CARE
Assumed care at 0730  Orientated x4, NSR, RA   Denies pain     Nq4; no new neuro deficits   Educated on diabetes and insulin administration importance   Urinary frequency per patient   PT/ OT completed   Needs met     Plan for rehab at discharge, continue education

## 2024-04-21 NOTE — H&P
[facility]     Hospitalist Progress Note     Doug Johnson Patient Status:  Inpatient    1950 MRN [id number]   Location [facility] 7NE-A Attending Braulio Guerrero MD   Hosp Day # 1 PCP Jean Lunsford MD     Chief Complaint: stroke     Subjective:     Patient doing ok.  Was initially refusing insulin.  Still w R sided weakness.      Objective:    Review of Systems:   A comprehensive review of systems was completed; pertinent positive and negatives stated in subjective.    Vital signs:  Temp:  [97.5 °F (36.4 °C)-98.5 °F (36.9 °C)] 98.4 °F (36.9 °C)  Pulse:  [63-98] 63  Resp:  [15-21] 16  BP: (137-174)/(74-87) 137/74  SpO2:  [94 %-100 %] 99 %    Physical Exam:    General: No acute distress  Respiratory: No wheezes, no rhonchi  Cardiovascular: S1, S2, regular rate and rhythm  Abdomen: Soft, Non-tender, non-distended, positive bowel sounds  Neuro: No new focal deficits.   Extremities: RUE and RLL weakness      Diagnostic Data:    Labs:  Recent Labs   Lab 24  17224  0632   WBC 9.0 7.0   HGB 13.4 12.4*   MCV 82.5 81.9   .0 236.0   INR 1.00  --        Recent Labs   Lab 24  0632   * 255*   BUN 33* 37*   CREATSERUM 1.85* 1.76*   CA 8.9 8.8   ALB 3.6  --     140   K 4.1 3.8    109   CO2 22.0 25.0   ALKPHO 99  --    AST 25  --    ALT 23  --    BILT 0.4  --    TP 7.3  --        CrCl cannot be calculated (Unknown ideal weight.).    Recent Labs   Lab 24   TROPHS 10       Recent Labs   Lab 24   PTP 13.2   INR 1.00              # acute/subactue CVA anterolateral inferior L thalamus  # focal stenosis of R and L posterior cerebral arteries  - CVA order set, permissive HTN 48 h, if BP remain elevated tomorrow consider starting anti- HTN such as ace  - asa, plavix, statin  - ECHO nml EF, no clot  - seen by speech and cleared for po  - PT/OT rec acute rehab  - holter on dc (neuro ordered)    # DM2,  uncontrolled, insulin dependent  - hga1c 12.1.  on humilin u500 70 u qam and 30-40 u q pm  - tresiba 35 u daily, carb ratio 1:7 and medium dose ssi here.    - adjust as needed    # elevated cr  - suspect may be CKD, last cr 2017 1.06.         hep subcutaneous

## 2024-04-21 NOTE — PROGRESS NOTES
Patient is on insulin U-500 penformulation (pen vs. vial)  70 units qAM + 30-40 units qPM (based on what he eats) frequency, per PTA med list  [employer] [facility] provides the following dosing information.  Insulin U-500 pen formulation (pen vs. vial)  50 units TID frequency.    Since it has been established that pt is non-adherent to prescribed regimen, and blood sugars have been uncontrolled (200s to mid 300s) during hospitalization, the following inpatient dosing regimen was based off of the 50 unit TID frequency:    Basal insulin: insulin degludec 35 units daily (technicially calculated as 37 units daily) but will watch & monitor for future adjustments.    Correction: insulin aspart 1 unit for every 24 mg/dl above 140 mg/dl TID    Prandial: insulin aspart 1 unit for every 7g carbohydrates    Dr. Guerrero was provided the above information.

## 2024-04-21 NOTE — PLAN OF CARE
Received pt at 1930  Pt AOx4, SR/Afib, RA, VSS  Neuro checks. See flowsheets.  Pharmacy consulted for insulin management   D/c Planning: Per specialties. SW to meet with pt/family   Call light within reach.  Needs currently met      Problem: Diabetes/Glucose Control  Goal: Glucose maintained within prescribed range  Description: INTERVENTIONS:  - Monitor Blood Glucose as ordered  - Assess for signs and symptoms of hyperglycemia and hypoglycemia  - Administer ordered medications to maintain glucose within target range  - Assess barriers to adequate nutritional intake and initiate nutrition consult as needed  - Instruct patient on self management of diabetes  Outcome: Progressing     Problem: NEUROLOGICAL - ADULT  Goal: Achieves stable or improved neurological status  Description: INTERVENTIONS  - Assess for and report changes in neurological status  - Initiate measures to prevent increased intracranial pressure  - Maintain blood pressure and fluid volume within ordered parameters to optimize cerebral perfusion and minimize risk of hemorrhage  - Monitor temperature, glucose, and sodium. Initiate appropriate interventions as ordered  Outcome: Progressing  Goal: Absence of seizures  Description: INTERVENTIONS  - Monitor for seizure activity  - Administer anti-seizure medications as ordered  - Monitor neurological status  Outcome: Progressing  Goal: Remains free of injury related to seizure activity  Description: INTERVENTIONS:  - Maintain airway, patient safety  and administer oxygen as ordered  - Monitor patient for seizure activity, document and report duration and description of seizure to MD/LIP  - If seizure occurs, turn patient to side and suction secretions as needed  - Reorient patient post seizure  - Seizure pads on all 4 side rails  - Instruct patient/family to notify RN of any seizure activity  - Instruct patient/family to call for assistance with activity based on assessment  Outcome: Progressing  Goal:  Achieves maximal functionality and self care  Description: INTERVENTIONS  - Monitor swallowing and airway patency with patient fatigue and changes in neurological status  - Encourage and assist patient to increase activity and self care with guidance from PT/OT  - Encourage visually impaired, hearing impaired and aphasic patients to use assistive/communication devices  Outcome: Progressing

## 2024-04-21 NOTE — CM/SW NOTE
Patient seen by therapies with acute rehab recommendation--obtained PM&R consult for [facility] physiatrist to see--once evaluated will confirm facility choice and start insurance auth

## 2024-04-22 LAB
GLUCOSE BLD-MCNC: 153 MG/DL (ref 70–99)
GLUCOSE BLD-MCNC: 163 MG/DL (ref 70–99)
GLUCOSE BLD-MCNC: 235 MG/DL (ref 70–99)
GLUCOSE BLD-MCNC: 296 MG/DL (ref 70–99)
POTASSIUM SERPL-SCNC: 4.1 MMOL/L (ref 3.5–5.1)

## 2024-04-22 RX ORDER — POLYETHYLENE GLYCOL 3350 17 G/17G
17 POWDER, FOR SOLUTION ORAL DAILY PRN
Status: DISCONTINUED | OUTPATIENT
Start: 2024-04-22 | End: 2024-04-26

## 2024-04-22 RX ORDER — BISACODYL 10 MG
10 SUPPOSITORY, RECTAL RECTAL
Status: DISCONTINUED | OUTPATIENT
Start: 2024-04-22 | End: 2024-04-26

## 2024-04-22 RX ORDER — DOCUSATE SODIUM 100 MG/1
100 CAPSULE, LIQUID FILLED ORAL 2 TIMES DAILY
Status: DISCONTINUED | OUTPATIENT
Start: 2024-04-22 | End: 2024-04-26

## 2024-04-22 RX ORDER — ENEMA 19; 7 G/133ML; G/133ML
1 ENEMA RECTAL ONCE AS NEEDED
Status: DISCONTINUED | OUTPATIENT
Start: 2024-04-22 | End: 2024-04-26

## 2024-04-22 NOTE — PROGRESS NOTES
[facility]   part of [facility] Hospitalist Progress Note     Doug Johnson Patient Status:  Inpatient    1950 MRN [id number]   Location [facility] 7NE-A Attending Herman Samson, DO   Hosp Day # 2 PCP Jean Lunsford MD     Follow Up:  The encounter diagnosis was Acute CVA (cerebrovascular accident) (HCC).    Subjective:     Patient seen and examined.  States his RUE and RLE are still weak but speech improved.  No F/C, N/V.      Objective:    Review of Systems:   10 point ROS completed and was negative, except for pertinent positive and negatives stated in subjective.    Vital signs:  Temp:  [98 °F (36.7 °C)-98.5 °F (36.9 °C)] 98 °F (36.7 °C)  Pulse:  [68-88] 88  Resp:  [6-20] 17  BP: (114-179)/() 179/82  SpO2:  [96 %-99 %] 96 %    Physical Exam:    General: No acute distress.   HEENT:  EOMI, PERRLA, OP clear  Respiratory: Clear to auscultation bilaterally. No wheezes. No rhonchi.  Cardiovascular: S1, S2. Regular rate and rhythm. No murmurs.  Abdomen: Soft, nontender, nondistended.  Positive bowel sounds. No rebound or guarding.  Extremities: No edema.  Neuro:  +RUE and RLE weakness      Diagnostic Data:    Labs:  Recent Labs   Lab 24  1726 24  0632   WBC 9.0 7.0   HGB 13.4 12.4*   MCV 82.5 81.9   .0 236.0   INR 1.00  --        Recent Labs   Lab 24  1726 24  0632 24  0525   * 255*  --    BUN 33* 37*  --    CREATSERUM 1.85* 1.76*  --    CA 8.9 8.8  --    ALB 3.6  --   --     140  --    K 4.1 3.8 4.1    109  --    CO2 22.0 25.0  --    ALKPHO 99  --   --    AST 25  --   --    ALT 23  --   --    BILT 0.4  --   --    TP 7.3  --   --        CrCl cannot be calculated (Unknown ideal weight.).    Recent Labs   Lab 24  1726   PTP 13.2   INR 1.00            COVID-19 Lab Results    COVID-19  No results found for: \"COVID19\"    Pro-Calcitonin  No results for input(s): \"PCT\" in the last 168  hours.    Cardiac  No results for input(s): \"TROP\", \"PBNP\" in the last 168 hours.    Creatinine Kinase  No results for input(s): \"CK\" in the last 168 hours.    Inflammatory Markers  No results for input(s): \"CRP\", \"TANESHA\", \"LDH\", \"DDIMER\" in the last 168 hours.    Imaging: Imaging data reviewed in Epic.    Medications:    insulin degludec  35 Units Subcutaneous Daily    aspirin  81 mg Oral Daily    heparin  5,000 Units Subcutaneous Q8H JUAN JOSE    atorvastatin  80 mg Oral Nightly    clopidogrel  75 mg Oral Daily    insulin aspart  1-68 Units Subcutaneous TID CC    insulin aspart  1-10 Units Subcutaneous TID AC and HS     Assessment & Plan:      74 year old male with PMH sig for DM2, presents to ER for eval R arm and L weakness and numbness.     # acute/subactue CVA anterolateral inferior L thalamus  # focal stenosis of R and L posterior cerebral arteries  - CVA order set, permissive HTN 48 h, if BP remain elevated tomorrow consider starting anti- HTN such as ace  - asa, plavix, statin  - ECHO nml EF, no clot  - seen by speech and cleared for po  - PT/OT rec acute rehab  - holter on dc (neuro ordered)      # DM2, uncontrolled, insulin dependent  - hga1c 12.1.  on humilin u500 70 u qam and 30-40 u q pm  - tresiba 35 u daily, carb ratio 1:7 and medium dose ssi here.    - adjust as needed    # elevated cr  - suspect may be CKD, last cr 2017 1.06.       Plan of care: inpt care.      Plan of care discussed with patient or family at bedside.    Herman Samson DO    Supplementary Documentation:     Quality:  DVT Prophylaxis: hep subcutaneous   CODE status: FULL  [place]: no  Central line: no  If COVID testing is negative, may discontinue isolation: yes     Estimated date of discharge: TBD  Discharge is dependent on: clinical course   At this point Mr. Johnson is expected to be discharge to: acute rehab     Plan of care discussed with pt

## 2024-04-22 NOTE — DIETARY NOTE
[facility]   part of [facility]   CLINICAL NUTRITION    Doug Johnson     Admitting diagnosis:  Acute CVA (cerebrovascular accident) ([facility]) [I63.9]    Ht:  5'10\"  Wt: 97.5 kg (215 lb).   Body mass index is 30.85 kg/m².  IBW: 75.5 kg    Wt Readings from Last 6 Encounters:   04/20/24 97.5 kg (215 lb)   10/07/22 95.3 kg (210 lb)   12/23/19 99.5 kg (219 lb 4.8 oz)   05/06/19 97.5 kg (215 lb)   09/05/18 97.5 kg (215 lb)   07/11/18 97.5 kg (215 lb)        Labs/Meds reviewed    Diet:       Procedures    Carbohydrate controlled diet 1800 kcal/60 grams; Is Patient on Accuchecks? Yes     Percent Meals Eaten (last 3 days)       Date/Time Percent Meals Eaten (%)    04/20/24 1915 100 %    04/21/24 0800 0 %     Percent Meals Eaten (%): Patient refused breakfast at 04/21/24 0800    04/22/24 1100 100 %            Pt chart reviewed d/t elevated A1C.  Patient reports good appetite at this time.  Nursing notes reports Percent Meals Eaten (%): 100 % intake for last meal.  Tolerating po diet without diarrhea, emesis, or constipation.   No significant weight changes noted.     PMH includes DM, HTN.  Pt p/w acute CVA.  Pt seen for A1C 12.1%. Pt seen sitting in bed, eating breakfast.  Reports that he hasn't been very serious about his health or diet for years, but now feels motivated to make changes.  PTA he was basically eating 1 large meal / day and typically ate fast food.  Denies n/v/d. Pt appears well nourished.  No wt loss per EMR review.     Pt able to appropriately recall foods that contain carbs.  Discussed importance of eating consistently throughout the day.  Discussed limiting carb portions . Suggested 60g CHO/meal.  Pt motivated to make changes, may benefit from ongoing education with care partner present.  RD will reinforce as able.    Patient is at low nutrition risk at this time.    Please consult if patient status changes or nutrition issues arise.    Swati Philippe RD, LDN, [facility]  Clinical Dietitian  Phone  [phone]

## 2024-04-22 NOTE — CDS QUERY
DOCUMENTATION CLARIFICATION QUERY  Dear Dr. Samson,  Please further specify the etiology of the Right sided Weakness    [ x  ] Right  sided weakness due to/related to the CVA   [   ] Other (please specify) ________________________    CLINICAL INDICATORS:   -4/19/24 ED:  to ED with complaint of onset of right arm and leg weakness and numbness and difficulty using his right hand starting Thursday morning  4/22-Rehab MD consult: HPI:  1 day history of right-sided weakness.He was increasingly  noting right lower extremity numbness and decided to come to the hospital.In  the emergency department patient had an MRI of the brain which reported left  inferior thalamus infarct  Rehab diagnosis:  Impaired mobility and activities of daily living dysfunction secondary to acute stroke.    MSK: 30 degrees shoulder abduction.4/5 strength for EF, 4-/5 R EE.  is 4,  unable to perform finger extension, wrist extension to neutral. Full strength on  left upper and lower.    RISK FACTORS:  Hx of DVT - DM2 - HTN     TREATMENT: Rehab MD consult - PT - OT                               Use of terms such as suspected, possible, or probable (associated with a specific diagnosis that is being evaluated, monitored, or treated as if it exists) are acceptable and can be coded in the inpatient setting, when documented at the time of discharge.     Please add any additional documentation to your progress note and continue to document this through discharge.  Daija Trent RN, BSN, CWOCN  [facility] Clinical   [phone]                                                                                    THIS FORM IS A PART OF THE PERMANENT MEDICAL RECORD

## 2024-04-22 NOTE — CM/SW NOTE
FRANCISCO JAVIER met with pt and family at bedside. Agreeable to [place] at [place]. Pt is very motivated to work towards his goals. Message sent to  liaison  for insurance auth to be initiated. Pt made aware that insurance auth would be submitted tomorrow AM.     NADYA Harry       Negative

## 2024-04-22 NOTE — DISCHARGE SUMMARY
[facility] Internal Medicine Hospitalist Discharge Summary     Patient ID:  Doug Johnson  74 year old  2/5/1950    Admit date: 4/19/2024    Discharge date and time: 4/26/2024    Attending Physician: Herman Samson DO     Primary Care Physician: Jean Lunsford MD     Discharge Diagnoses:  acute/subactue CVA anterolateral inferior L thalamus  ocal stenosis of R and L posterior cerebral arteries  R sided weakness due to CVA  Essential HTN  DM II  CKD stage 3    Please note that only [facility] DMG and EMG patients enrolled in the Medicare ACO, [employer] [facility] ACO and [employer] HMOs will be handled by the [facility] Care Management team.  For all other patients, please follow usual protocol for discharge care transition.    Discharge Condition: stable    Disposition:  [facility] Acute rehab     Important Follow up:  - PCP within 1 week of rehab discharge   - Consults: Neuro, renal, PMR    Follow Up Items:  Repeat BMP per renal      Hospital Course:      74 year old male with PMH sig for DM2, presents to ER for eval R arm and L weakness and numbness.      # acute/subactue CVA anterolateral inferior L thalamus  # focal stenosis of R and L posterior cerebral arteries  # R sided weakness due to CVA  # Essential HTN  - CVA order set, permissive HTN 48 h, if BP remain elevated tomorrow consider starting anti- HTN such as ace  - asa, plavix, statin  - ECHO nml EF, no clot  - seen by speech and cleared for po  - PT/OT rec acute rehab  - holter on dc (neuro ordered)   - cont normotension per neuro.  D/w renal, start losartan 25 mg po daily today.  Monitor BMP as outpt     # DM2, uncontrolled, insulin dependent  - hga1c 12.1.  on humilin u500 70 u qam and 30-40 u q pm  - tresiba 35 u daily, carb ratio 1:7 and medium dose ssi here.    - adjust as needed    # elevated cr, suspected CKD stage 3  - suspect may be CKD, last cr 2017 1.06.    - renal c/s, d/w Dr. Morton   - monitor as outpt     Stable  for discharge to  acute rehab.     Consults: IP CONSULT TO NEUROLOGY  IP CONSULT TO SOCIAL WORK  IP CONSULT TO PHARMACY  IP CONSULT TO PHYSICAL MEDICINE REHAB  IP CONSULT TO SOCIAL WORK    Operative Procedures:  None      Patient instructions:      I as the attending physician reconciled the current and discharge medications on day of discharge.        Medication List        START taking these medications      aspirin 81 MG Chew  Chew 1 tablet (81 mg total) by mouth daily.     atorvastatin 80 MG Tabs  Commonly known as: Lipitor  Take 1 tablet (80 mg total) by mouth nightly.     clopidogrel 75 MG Tabs  Commonly known as: Plavix  Take 1 tablet (75 mg total) by mouth daily.     docusate sodium 100 MG Caps  Commonly known as: COLACE     losartan 25 MG Tabs  Commonly known as: Cozaar     Polyethylene Glycol 3350 17 g Pack  Commonly known as: MIRALAX            CONTINUE taking these medications      cholecalciferol 125 MCG (5000 UT) Tabs     HumuLIN R U-500 KwikPen 500 UNIT/ML Sopn  Generic drug: insulin regular human (conc)     Magnesium Gluconate 500 (27 Mg) MG Tabs     Vitamin K (Phytonadione) 100 MCG Tabs               Where to Get Your Medications        These medications were sent to [id number] [place] [id number] [facility] [address], [phone], [phone]  [facility] [address]      Phone: [phone]   aspirin 81 MG Chew  atorvastatin 80 MG Tabs  clopidogrel 75 MG Tabs            Activity:  Up with assist  Diet: cardiac diet and diabetic diet  Wound Care: as directed  Code Status: No Order      Exam on day of discharge:     Vitals:    04/22/24 1230   BP: (!) 179/82   Pulse: 88   Resp: 17   Temp:        General: No acute distress.   HEENT:  EOMI, PERRLA, OP clear  Respiratory: Clear to auscultation bilaterally. No wheezes. No rhonchi.  Cardiovascular: S1, S2. Regular rate and rhythm. No murmurs.  Abdomen: Soft, nontender, nondistended.  Positive  bowel sounds. No rebound or guarding.  Extremities: No edema.  Neuro:  +RUE and RLE weakness      Total time coordinating care for discharge: Greater than 30 minutes    Herman Samson DO  [facility]

## 2024-04-22 NOTE — OCCUPATIONAL THERAPY NOTE
OCCUPATIONAL THERAPY EVALUATION - INPATIENT     Room Number: 7624/7624-A  Evaluation Date: 4/22/2024  Type of Evaluation: Initial  Presenting Problem: Acute CVA    Physician Order: IP Consult to Occupational Therapy  Reason for Therapy: ADL/IADL Dysfunction and Discharge Planning    OCCUPATIONAL THERAPY ASSESSMENT   Patient is currently functioning below baseline with toileting, bathing, upper body dressing, lower body dressing, grooming, eating, bed mobility, transfers, stating sitting balance, dynamic sitting balance, static standing balance, dynamic standing balance, maintaining seated position, functional standing tolerance, and energy conservation strategies. Prior to admission, patient's baseline is completely independent.  Patient is requiring minimal assist and maximum assist as a result of the following impairments: decreased functional strength, decreased functional reach, decreased endurance, impaired dynamic sitting and standing balance, impaired coordination, and limited RUE/RLE ROM. Occupational Therapy will continue to follow for duration of hospitalization.    Patient will benefit from continued skilled OT Services to facilitate return to prior level of function as patient demonstrates high motivation with excellent tolerance to an intensive therapy program       History Related to Current Admission: Patient is a 74 year old male admitted on 4/19/2024 with Presenting Problem: Acute CVA. Co-Morbidities : [place], right glenohumeral arthritis, DMII    Imaging  MRI of brain 4/19/24 CONCLUSION:     Recent subacute infarct with a subcentimeter focus of restricted diffusion, but also showing increased T2/FLAIR signal indicating subacute nature of the infarct within the anterolateral inferior left thalamus.  Focal stenosis identified involving both right and left proximal posterior cerebral arteries.     Recommendations for nursing staff:   Transfers: Sera Stedy  Toileting location: commode  Positioning:  support right arm on pillow w/ placement in neutral      EVALUATION SESSION:  Patient Start of Session: supine  FUNCTIONAL TRANSFER ASSESSMENT  Sit to Stand: Edge of Bed; Chair  Edge of Bed: Moderate Assist  Chair: Maximum Assist  Commode Transfer: Maximum Assist    BED MOBILITY  Rolling: Minimal Assist  Supine to Sit : Moderate Assist  Sit to Supine (OT): Not Tested  Scooting: min A    BALANCE ASSESSMENT  Static Sitting: Moderate Assist  Static Standing: Moderate Assist    FUNCTIONAL ADL ASSESSMENT  Eating: Minimal Assist (assist w/ set-up; pt able to feed self with use of left hand)  Grooming Seated: Minimal Assist (w use of left hand)  LB Dressing Seated: Maximum Assist      ACTIVITY TOLERANCE: WFL                         O2 SATURATIONS  Oxygen Therapy  SPO2% on Room Air at Rest: 95  SPO2% Ambulation on Room Air: 95    COGNITION  Attention Span:  attends with cues to redirect  Orientation Level:  oriented x4  Memory:  appears intact  Following Commands:  follows one step commands with repetition and d/t [place]  Initiation: appears intact  Motor Planning: intact  Perseveration: not present  Safety Judgement:  good awareness of safety precautions  Awareness of Errors:  good awareness of errors made  Awareness of Deficits:  fully aware of deficits  Problem Solving:  assistance required to implement solutions  COGNITION ASSESSMENTS  Short Blessed Test: 4  SHORT BLESSED TEST of Attention and Memory (SBT) was administered.  Patient scored 4 on this screening test.   In the original validation sample for the SBT (Yamil et al., 1983), 90% of normal scores 6 points or less. Scores of 7 or higher would indicate a need for further evaluation to rule out a dementing disorder, such as Alzheimer’s disease.    Therapist led patient to complete PHQ9, pt scored 6 on PHQ9.  Scores demonstrate:  0-4 - Min risk of depression  5-9 - Mild risk of depression  10-14 - Moderate risk of depression  15-19 - Moderately severe risk of  depression  20-27 - Severe risk of depression  Pt reports he is not depressed and is only rating areas of no sleep, fatigue and decreased appetite when thinking of the last 2 nights of frequently interrupted sleep.      VISION  Head Position:  WDL = within defined limits  Tracking:  able to track stimulus in all quads without difficulty  Saccades:  WFL  Peripheral:  intact    PERCEPTION  Left Attention:   intact  Right Attention:   intact  Position in Space:   intact    Communication: Able to make needs known    Behavioral/Emotional/Social: patient pleasant, cooperative; reports feeling anxious about getting movement back.    UPPER EXTREMITY  ROM: within functional limits except for the following:  Right Shoulder flexion 1/2 active; full passive  Right Elbow flexion >1/2, full passive  Right Elbow extension <1/4, full passive  Right Wrist flexion >1/2, full passive  Right Wrist extension 0 active; full passive  Right  >3/4 active; full passive  Right digit 2-4 extension: 0; full passive    Strength: is within functional limits except for the following;  Right Shoulder flexion  2/5  Right Elbow flexion  3-/5  Right Elbow extension  2-/5  Right Wrist flexion  3/5  Right Wrist extension  0/5  Right   3-/5  Coordination  Gross motor: impaired  Fine motor: impaired  Sensation: Light touch:  intact  Proprioception:  intact  Stereognosis:  intact    Neurological findings:  Neurological Findings: Coordination - Finger to Nose;Coordination - Rapid Alternating Movement;Coordination - Finger Opposition  Coordination - Finger to Nose: Asymmetrical;Right decreased accuracy  Coordination - Rapid Alternating Movement: Asymmetrical;Right decreased accuracy  Coordination - Finger Opposition: Asymmetrical;Right decreased accuracy       EDUCATION PROVIDED  Patient: Role of Occupational Therapy; Plan of Care; Discharge Recommendations; Functional Transfer Techniques; Fall Prevention; Compensatory ADL Techniques; Energy  Conservation; Proper Body Mechanics  Patient's Response to Education: Verbalized Understanding; Requires Further Education    Equipment used: RW  Demonstrates functional use, Would benefit from additional trial      Therapist comments: OT educated patient on safety,  sequencing, energy conservation, pain management, home modifications and adaptive equipment to increase independence with ADLs.    Patient requires frequent redirection d/t easily distracted w/ conversation.      Facilitated WB on RUE/RLE, postural training, weight shifting, AAROM RUE; instruction in SROM to RUE.  Patient will need further instruction.    Pt educated on visual and verbal input when using RUE, encouraged use of RUE during all self-care.  Increased time spent educating patient on importance of active use.  Will need further instruction.    Patient End of Session: Up in chair;With  staff;Needs met;Call light within reach;RN aware of session/findings;All patient questions and concerns addressed;Discussed recommendations with /    OCCUPATIONAL PROFILE    HOME SITUATION  Type of Home: House  Home Layout: Two level  Lives With: Spouse (daughter and family lives in the basement apartment)    Toilet and Equipment: Comfort height toilet  Shower/Tub and Equipment: Walk-in shower;Tub-shower combo  Other Equipment: None    Occupation/Status: retired construction work; [place]  Hand Dominance: Right  Drives: Yes  Patient Regularly Uses: Glasses    Prior Level of Function: independent with ADLs and functional mobility without use of adaptive device.  Patient reports no falls     SUBJECTIVE   \"This arm is useless now.   I can't believe this happened to me.\"    PAIN ASSESSMENT  [place]  Location: denies       OBJECTIVE  Precautions:  (SBP <190)  Fall Risk: High fall risk      ASSESSMENTS  AM-PAC ‘6-Clicks’ Inpatient Daily Activity Short Form  -   Putting on and taking off regular lower body clothing?: A Lot  -   Bathing  (including washing, rinsing, drying)?: A Lot  -   Toileting, which includes using toilet, bedpan or urinal? : A Lot  -   Putting on and taking off regular upper body clothing?: A Lot  -   Taking care of personal grooming such as brushing teeth?: A Little  -   Eating meals?: A Little    AM-PAC Score:  Score: 14  Approx Degree of Impairment: 59.67%  Standardized Score (AM-PAC Scale): 33.39    ADDITIONAL TESTS  PHQ9: Yes    NEUROLOGICAL FINDINGS  Coordination - Finger to Nose: Asymmetrical; Right decreased accuracy  Coordination - Rapid Alternating Movement: Asymmetrical; Right decreased accuracy  Coordination - Finger Opposition: Asymmetrical; Right decreased accuracy       PLAN  OT Treatment Plan: Balance activities;Energy conservation/work simplification techniques;ADL training;Functional transfer training;UE strengthening/ROM;Endurance training;Patient/Family education;Patient/Family training;Equipment eval/education;Fine motor coordination activities;Neuromuscluar reeducation;Compensatory technique education  Rehab Potential : Good  Frequency: 3-5x/week  Number of Visits to Meet Established Goals: 7    ADL Goals   Patient will perform eating: with modified independent and with adaptive equipment PRN  Patient will perform grooming: with setup and with adaptive equipment PRN  Patient will perform lower body dressing:  with mod assist, while in chair, and with adaptive equipment PRN  Patient will perform toileting: with mod assist    Functional Transfer Goals  Patient will transfer in bed by rolling:  with modified independent  Patient will transfer from bed to chair:  with min assist  Patient will transfer from sit to supine:  with min assist  Patient will transfer from supine to sit:  with min assist  Patient will transfer from sit to stand:  with min assist  Patient will transfer to bedside commode:  with min assist    UE Exercise Program Goal  Patient will be supervision with right SAYDA MONTALVO (home exercise  program).      Therapist Goals  Complete PhQ9-MET 4/22/24    Patient Evaluation Complexity Level:   Occupational Profile/Medical History MODERATE - Expanded review of history including review of medical or therapy record   Specific performance deficits impacting engagement in ADL/IADL MODERATE  3 - 5 performance deficits   Client Assessment/Performance Deficits MODERATE - Comorbidities and min to mod modifications of tasks    Clinical Decision Making MODERATE - Analysis of occupational profile, detailed assessments, several treatment options    Overall Complexity MODERATE     OT Session Time: 40 minutes  Self-Care Home Management: 15 minutes

## 2024-04-22 NOTE — PROGRESS NOTES
Stroke booklet given to patient; the following education was provided:     What is stroke  BEFAST - Stroke warning sings and symptoms  How to initiate EMS  Secondary stroke prevention and personalized risk factors: Afib, DVT, DM  Lifestyle modifications (nutrition and exercise)  Post-stroke recovery and follow-up  Community resources (stroke support group and non-emergent stroke line)  Patient's identified goal: Regain strength to R side     No family present during education. Patient receptive to teachings. All pertinent questions and concerns were addressed.      RN Stroke Navigator team  [phone]

## 2024-04-22 NOTE — PAYOR COMM NOTE
--------------  ADMISSION REVIEW     Payor: [employer] PPO  Subscriber #:  [id number]  Authorization Number: [id number]    Admit date: 4/20/24  Admit time:  1:35 PM       REVIEW DOCUMENTATION:     ED Provider Notes          Patient Seen in: [facility] Emergency Department      History     Chief Complaint   Patient presents with    Numbness Weakness     Stated Complaint: numbness in Right leg    Subjective:   HPI    74-year-old male history of diabetes presents to ED with complaint of onset of right arm and leg weakness and numbness and difficulty using his right hand starting Thursday morning.  He states he awoke with the symptoms.  Denies speech difficulty, facial droop.   Denies history of stroke.  He states that he mowed the lawn for 2 hours straight on Wednesday and had some cramping in his right leg.  He states he has a history of DVT in the right leg, no longer on thinners.  Denies history of stroke.    Objective:   Past Medical History:    Diabetes mellitus (HCC)    Type II or unspecified type diabetes mellitus without mention of complication, not stated as uncontrolled   Review of Systems    Positive for stated complaint: numbness in Right leg  Other systems are as noted in HPI.  Constitutional and vital signs reviewed.      All other systems reviewed and negative except as noted above.    Physical Exam     ED Triage Vitals   BP 04/19/24 1719 (!) 196/96   Pulse 04/19/24 1719 74   Resp 04/19/24 1719 18   Temp 04/19/24 1724 98.6 °F (37 °C)   Temp src 04/19/24 1724 Temporal   SpO2 04/19/24 1719 97 %   O2 Device 04/19/24 2030 None (Room air)       Current:BP (!) 185/88   Pulse 67   Temp 98.6 °F (37 °C) (Temporal)   Resp 23   Wt 97.5 kg   SpO2 96%   BMI 30.85 kg/m²         Physical Exam    Vital signs reviewed.  Nursing note reviewed.  Constitutional: Alert, well-appearing  Head: Normocephalic, atraumatic  Mouth: Moist  Eyes: Extraocular muscles intact, pupils equal  Cardiovascular: Regular rate  and rhythm  Pulmonary: Effort normal, breath sounds normal  Abdomen: Soft, nontender nondistended  Skin: Warm and dry  Musculoskeletal range of motion grossly normal all extremities  Neuro: Alert, oriented x 3, face symmetric, speech clear.  Right arm pronator drift, right  4/5.  Ataxic right hand with finger-to-nose.  Right leg unable to lift against gravity.  Left upper and lower extremity 5/5.  Psych: Mood normal          ED Course     Labs Reviewed   COMP METABOLIC PANEL (14) - Abnormal; Notable for the following components:       Result Value    Glucose 206 (*)     BUN 33 (*)     Creatinine 1.85 (*)     Calculated Osmolality 303 (*)     eGFR-Cr 38 (*)     All other components within normal limits   URINALYSIS WITH CULTURE REFLEX - Abnormal; Notable for the following components:    Glucose Urine 300 (*)     Ketones Urine 10 (*)     Blood Urine 2+ (*)     Protein Urine 100 (*)     RBC Urine >10 (*)     All other components within normal limits   POCT GLUCOSE - Abnormal; Notable for the following components:    POC Glucose 197 (*)     All other components within normal limits   PROTHROMBIN TIME (PT) - Normal   PTT, ACTIVATED - Normal   TROPONIN I HIGH SENSITIVITY - Normal   CBC WITH DIFFERENTIAL WITH PLATELET    Narrative:     The following orders were created for panel order CBC With Differential With Platelet.  Procedure                               Abnormality         Status                     ---------                               -----------         ------                     CBC W/ DIFFERENTIAL[[id number]]                              Final result                 Please view results for these tests on the individual orders.   RAINBOW DRAW LAVENDER   RAINBOW DRAW LIGHT GREEN   RAINBOW DRAW BLUE   CBC W/ DIFFERENTIAL     EKG    Rate, intervals and axes as noted on EKG Report.  Rate: 76  Rhythm: Sinus Rhythm  Reading: Old right bundle branch block, no new ST-T wave abnormality            MRI BRAIN MRA  HEAD+MRA NECK (ALL W+WO) (CPT=70553/83184/66736)    Result Date: 4/19/2024  PROCEDURE:  MRI BRAIN MRA HEAD+MRA NECK (ALL W+WO) (CPT=70553/68148/69308)  COMPARISON:  None.  INDICATIONS:  eval CVA  TECHNIQUE:  MRI of the brain was performed with multi-planar T1, T2-weighted images with FLAIR sequences and diffusion weighted images without and with infusion.  MR angiography of the brain without and with infusion and MR angiography of the neck without and with infusion was performed using 3D time of flight, multi-planar and 3D reconstructed images. All measurements obtained in this exam were performed using NASCET criteria.  PATIENT STATED HISTORY:(As transcribed by Technologist)  Right sided body numbness and weakness.   CONTRAST USED:  20 mL of Dotarem  FINDINGS:   Recent subacute infarct with restricted diffusion as well as increased signal on T2/FLAIR imaging indicating the subacute nature of the infarct located within the left anterolateral inferior thalamus measuring 9 mm.  No other restricted diffusion.  No midline shift mass effect herniation hydrocephalus.  Only mild chronic ischemic white matter changes.  Mild cerebral atrophy.   No pathologic gradient susceptibility. Flow-voids are present within the right and left internal carotid arteries, the basilar artery, and right and left distal vertebral arteries   MRA  Extracranial MRA of the neck shows patency of the right and left vertebral arteries, the right and left common carotid, internal carotid and external carotid arteries and carotid bulb without sign of dissection, occlusion or acute thrombosis of these vessels. No hemodynamically significant stenosis is identified   Intracranial MRA of the intracranial circulation shows patency of the distal right and left vertebral arteries, the distal right and left cervical ICA, the intracranial ICA, and the anterior, middle, and posterior cerebral arteries bilaterally.  The basilar artery is also patent.  Focal  stenosis seen involving the proximal right greater than left posterior cerebral arteries No flow limiting stenosis identified involving the right or left anterior, middle cerebral arteries.            CONCLUSION:   Recent subacute infarct with a subcentimeter focus of restricted diffusion, but also showing increased T2/FLAIR signal indicating subacute nature of the infarct within the anterolateral inferior left thalamus.  Focal stenosis identified involving both right and left proximal posterior cerebral arteries.   LOCATION:  [facility]   Dictated by (CST): Bonifacio Herrera MD on 4/19/2024 at 10:29 PM     Finalized by (CST): Bonifacio Herrera MD on 4/19/2024 at 10:37 PM       XR CHEST AP PORTABLE  (CPT=71045)    Result Date: 4/19/2024  PROCEDURE:  XR CHEST AP PORTABLE  (CPT=71045)  TECHNIQUE:  AP chest radiograph was obtained.  COMPARISON:  [facility], XR, XR CHEST PA + LAT CHEST ([id number]=[id number]), 10/10/2016, 11:00 AM.  INDICATIONS:  numbness in Right leg  PATIENT STATED HISTORY: (As transcribed by Technologist)  Patient offered no additional history at this time.              CONCLUSION:    Poor inspiration low lung volumes.  Cardiomegaly.  Left lower lobe patchy atelectasis or infiltrate. No sizable effusion, but small effusion difficult to exclude on portable chest x-ray. No evidence for pneumothorax.  Consider upright PA and lateral examination of the chest, when tolerated.  LOCATION:  [facility]      Dictated by (CST): Bonifacio Herrera MD on 4/19/2024 at 5:44 PM     Finalized by (CST): Bonifacio Herrera MD on 4/19/2024 at 5:44 PM           XR CHEST AP PORTABLE  (CPT=71045)    Result Date: 4/19/2024  PROCEDURE:  XR CHEST AP PORTABLE  (CPT=71045)  TECHNIQUE:  AP chest radiograph was obtained.  COMPARISON:  [facility], XR, XR CHEST PA + LAT CHEST ([id number]), 10/10/2016, 11:00 AM.  INDICATIONS:  numbness in Right leg  PATIENT STATED HISTORY: (As transcribed by Technologist)  Patient offered no additional history at this time.               CONCLUSION:    Poor inspiration low lung volumes.  Cardiomegaly.  Left lower lobe patchy atelectasis or infiltrate. No sizable effusion, but small effusion difficult to exclude on portable chest x-ray. No evidence for pneumothorax.  Consider upright PA and lateral examination of the chest, when tolerated.  LOCATION:  [facility]      Dictated by (CST): Bonifacio Herrera MD on 4/19/2024 at 5:44 PM     Finalized by (CST): Bonifacio Herrera MD on 4/19/2024 at 5:44 PM              [facility]      Medical Decision Making:    Differential diagnosis before testing includes CVA, electrolyte abnormality potential life threatening diagnosis which is a medical condition that poses a threat to life/function.     Comorbidities that add complexity to management: See HPI    I reviewed prior external ED notes including prior annual physical exam 10/23 diabetes    Discussions of management with: Neurology, Dr. Naidu      I personally reviewed the radiographs and my independent interpretation includes no lung consolidations, no pneumothorax.     Shared decision making:  Admission disposition: 4/19/2024 10:56 PM         MRI MRA brain subacute nature of the infarct within the anterolateral inferior left thalamus.  Discussed with neurology, recommended baby aspirin, Plavix and atorvastatin and admission to hospitalist.  Explained results and plan to patient.  He understands and agrees with plan.    Patient out of window for any stroke treatment.  He came to ED significantly over 24 hours from onset of symptoms.           Medical Decision Making      Disposition and Plan     Clinical Impression:  1. Acute CVA (cerebrovascular accident) (HCC)         Disposition:  Admit  4/19/2024 10:56 pm       Signed by Nadya Gordon DO on 4/19/2024 10:56 PM             History and Physical       Chief Complaint   Patient presents with    Numbness Weakness         PCP: Jean Lunsford MD        History of Present Illness: Patient is a 74 year old  male with PMH sig for DM2, presents to ER for eval R arm and L weakness and numbness.  Since started thursday am.  Awoke w sx.  Denies issues w speech, vision,.  No  prior ho cva.           Past Medical History:    Diabetes mellitus (HCC)    Type II or unspecified type diabetes mellitus without mention of complication, not stated as uncontrolled         MRI BRAIN MRA HEAD+MRA NECK (ALL W+WO) (CPT=70553/61503/59236)     Result Date: 4/19/2024  PROCEDURE:  MRI BRAIN MRA HEAD+MRA NECK (ALL W+WO) (CPT=70553/72029/15653)  COMPARISON:  None.  INDICATIONS:  eval CVA  TECHNIQUE:  MRI of the brain was performed with multi-planar T1, T2-weighted images with FLAIR sequences and diffusion weighted images without and with infusion.  MR angiography of the brain without and with infusion and MR angiography of the neck without and with infusion was performed using 3D time of flight, multi-planar and 3D reconstructed images. All measurements obtained in this exam were performed using NASCET criteria.  PATIENT STATED HISTORY:(As transcribed by Technologist)  Right sided body numbness and weakness.   CONTRAST USED:  20 mL of Dotarem  FINDINGS:   Recent subacute infarct with restricted diffusion as well as increased signal on T2/FLAIR imaging indicating the subacute nature of the infarct located within the left anterolateral inferior thalamus measuring 9 mm.  No other restricted diffusion.  No midline shift mass effect herniation hydrocephalus.  Only mild chronic ischemic white matter changes.  Mild cerebral atrophy.   No pathologic gradient susceptibility. Flow-voids are present within the right and left internal carotid arteries, the basilar artery, and right and left distal vertebral arteries   MRA  Extracranial MRA of the neck shows patency of the right and left vertebral arteries, the right and left common carotid, internal carotid and external carotid arteries and carotid bulb without sign of dissection, occlusion or acute  thrombosis of these vessels. No hemodynamically significant stenosis is identified   Intracranial MRA of the intracranial circulation shows patency of the distal right and left vertebral arteries, the distal right and left cervical ICA, the intracranial ICA, and the anterior, middle, and posterior cerebral arteries bilaterally.  The basilar artery is also patent.  Focal stenosis seen involving the proximal right greater than left posterior cerebral arteries No flow limiting stenosis identified involving the right or left anterior, middle cerebral arteries.             CONCLUSION:   Recent subacute infarct with a subcentimeter focus of restricted diffusion, but also showing increased T2/FLAIR signal indicating subacute nature of the infarct within the anterolateral inferior left thalamus.  Focal stenosis identified involving both right and left proximal posterior cerebral arteries.   LOCATION:  [facility]   Dictated by ([facility]): Bonifacio Herrera MD on 4/19/2024 at 10:29 PM     Finalized by (CST): Bonifacio Herrera MD on 4/19/2024 at 10:37 PM        XR CHEST AP PORTABLE  (CPT=71045)     Result Date: 4/19/2024  PROCEDURE:  XR CHEST AP PORTABLE  (CPT=71045)  TECHNIQUE:  AP chest radiograph was obtained.  COMPARISON:  [facility], XR, XR CHEST PA + LAT CHEST ([id number]), 10/10/2016, 11:00 AM.  INDICATIONS:  numbness in Right leg  PATIENT STATED HISTORY: (As transcribed by Technologist)  Patient offered no additional history at this time.               CONCLUSION:    Poor inspiration low lung volumes.  Cardiomegaly.  Left lower lobe patchy atelectasis or infiltrate. No sizable effusion, but small effusion difficult to exclude on portable chest x-ray. No evidence for pneumothorax.  Consider upright PA and lateral examination of the chest, when tolerated.  LOCATION:  [facility]      Dictated by (CST): Bonifacio Herrera MD on 4/19/2024 at 5:44 PM     Finalized by (CST): Bonifacio Herrera MD on 4/19/2024 at 5:44 PM           Assessment/Plan:       # acute/subactue CVA anterolateral inferior L thalamus  # focal stenosis of R and L posterior cerebral arteries  - CVA order set, permissive HTN  - asa, plavix, statin  - await ECHO  - seen by speech  - PT/OT  - await neuro eval     # DM2  - Hold oral hypoglycemics.    - Start SSI with qid accuchecks        Outpatient records or previous hospital records reviewed.   [facility] hospitalist to continue to follow patient while in house  A total of 75  minutes taken with patient and coordinating care.  Greater than 50% face to face encounter.       Duane Guerrero MD  [facility] Hospitalist            CONSULT  ASSESSMENT/PLAN   74 year old male with:     Acute left inferior thalamus infarct, as noted on the MRI.  MRA of the head and neck reported focal, noncritical stenosis of bilateral PCAs.  Echocardiogram did not show any intracardiac thrombi or shunting.  Hemoglobin A1c was 12.1, .  Patient's symptoms are at least 2 days old.  Advised OT/PT/rehab eval.  Patient to be started on dual antiplatelet and atorvastatin.  Bilateral focal PCA stenosis.  Patient to continue optimal medical management with dual antiplatelet and statin.  Patient will follow-up with neurology after discharge.  Will also schedule a follow-up for neurointervention team to review as outpatient.  Advised 30-day event monitoring for evaluation of occult atrial fibrillation.  Poorly controlled diabetes.  Patient was counseled regarding significantly elevated hemoglobin A1c and better control of diabetes.           Principal Problem:    Acute CVA (cerebrovascular accident) (HCC)        Phillip Masters MD  Neurohospitalist      MEDICATIONS ADMINISTERED IN LAST 1 DAY:  aspirin chewable tab 81 mg       Date Action Dose Route User    4/22/2024 1017 Given 81 mg Oral Sophie Roman RN          atorvastatin (Lipitor) tab 80 mg       Date Action Dose Route User    4/21/2024 2024 Given 80 mg Oral Dell Vaz RN          clopidogrel (Plavix)  tab 75 mg       Date Action Dose Route User    4/22/2024 1017 Given 75 mg Oral Sophie Roman RN          heparin (Porcine) 5000 UNIT/ML injection 5,000 Units       Date Action Dose Route User    4/22/2024 1017 Given 5,000 Units Subcutaneous (Left Lower Abdomen) Sophie Roman RN    4/22/2024 0242 Given 5,000 Units Subcutaneous (Right Lower Abdomen) Dell Vaz RN    4/21/2024 1804 Given 5,000 Units Subcutaneous (Right Lower Abdomen) Sophie Roman RN          insulin aspart (NovoLOG) 100 Units/mL FlexPen 1-68 Units       Date Action Dose Route User    4/22/2024 1228 Given 3 Units Subcutaneous (Right Upper Arm) Sophie Roman RN    4/21/2024 1803 Given 4 Units Subcutaneous (Left Lower Arm) Sophie Roman RN    4/21/2024 1343 Given 3 Units Subcutaneous (Left Lower Arm) Sophie Roman RN          insulin aspart (NovoLOG) 100 Units/mL FlexPen 1-10 Units       Date Action Dose Route User    4/21/2024 2131 Given 4 Units Subcutaneous (Right Upper Arm) Dell Vaz RN    4/21/2024 1656 Given 3 Units Subcutaneous (Right Lower Arm) Sophie Roman RN          insulin degludec 100 units/mL flextouch 35 Units       Date Action Dose Route User    4/22/2024 1024 Given 35 Units Subcutaneous (Left Upper Arm) Sophie Roman RN            Vitals (last day)       Date/Time Temp Pulse Resp BP SpO2 Weight O2 Device O2 Flow Rate (L/min) [facility]    04/22/24 0400 98 °F (36.7 °C) 68 -- 114/61 97 % -- None (Room air) -- AG    04/22/24 0400 -- -- 18 -- -- -- -- -- NA    04/22/24 0000 98.3 °F (36.8 °C) 72 18 141/68 99 % -- None (Room air) -- NA    04/21/24 2000 98.1 °F (36.7 °C) 80 17 152/81 98 % -- None (Room air) --     04/21/24 1600 98.4 °F (36.9 °C) 86 20 160/100 99 % -- None (Room air) -- CR    04/21/24 1200 98.4 °F (36.9 °C) 77 18 157/97 99 % -- None (Room air) -- CR    04/21/24 0800 98.4 °F (36.9 °C) 63 16 137/74 99 % -- None (Room air) -- CR    04/21/24 0400 97.5 °F (36.4 °C) 75 21 174/79 96 % -- None (Room air) -- NA     04/21/24 0000 98.5 °F (36.9 °C) 78 16 156/83 95 % -- None (Room air) -- AG          CIWA Scores (since admission)       None

## 2024-04-22 NOTE — PLAN OF CARE
Received pt at 1930  Pt AOx4, SR/Afib, RA, VSS  Neuro checks. See flowsheets.  D/c Planning: Needs OT & PMR. Anticipate AR once medically cleared   Call light within reach.  Needs currently met      Problem: Diabetes/Glucose Control  Goal: Glucose maintained within prescribed range  Description: INTERVENTIONS:  - Monitor Blood Glucose as ordered  - Assess for signs and symptoms of hyperglycemia and hypoglycemia  - Administer ordered medications to maintain glucose within target range  - Assess barriers to adequate nutritional intake and initiate nutrition consult as needed  - Instruct patient on self management of diabetes  Outcome: Progressing     Problem: NEUROLOGICAL - ADULT  Goal: Achieves stable or improved neurological status  Description: INTERVENTIONS  - Assess for and report changes in neurological status  - Initiate measures to prevent increased intracranial pressure  - Maintain blood pressure and fluid volume within ordered parameters to optimize cerebral perfusion and minimize risk of hemorrhage  - Monitor temperature, glucose, and sodium. Initiate appropriate interventions as ordered  Outcome: Progressing  Goal: Absence of seizures  Description: INTERVENTIONS  - Monitor for seizure activity  - Administer anti-seizure medications as ordered  - Monitor neurological status  Outcome: Progressing  Goal: Remains free of injury related to seizure activity  Description: INTERVENTIONS:  - Maintain airway, patient safety  and administer oxygen as ordered  - Monitor patient for seizure activity, document and report duration and description of seizure to MD/LIP  - If seizure occurs, turn patient to side and suction secretions as needed  - Reorient patient post seizure  - Seizure pads on all 4 side rails  - Instruct patient/family to notify RN of any seizure activity  - Instruct patient/family to call for assistance with activity based on assessment  Outcome: Progressing  Goal: Achieves maximal functionality and  self care  Description: INTERVENTIONS  - Monitor swallowing and airway patency with patient fatigue and changes in neurological status  - Encourage and assist patient to increase activity and self care with guidance from PT/OT  - Encourage visually impaired, hearing impaired and aphasic patients to use assistive/communication devices  Outcome: Progressing

## 2024-04-22 NOTE — PLAN OF CARE
Assumed care at 0730  Orientated x4, NSR/ HB, RA   Denies pain      Nq4; no new neuro deficits   Educated on diabetes and insulin administration importance   Needs met      Plan for rehab at discharge, continue education

## 2024-04-22 NOTE — CM/SW NOTE
Chart reviewed, plans for in person PMR today. PT/OT to see today. SW to follow up with pt/family for DC planning needs/recs.    NADYA Harry

## 2024-04-22 NOTE — PHYSICAL THERAPY NOTE
PHYSICAL THERAPY TREATMENT NOTE - INPATIENT    Room Number: 7624/7624-A     Session: 2     Number of Visits to Meet Established Goals: 5    Presenting Problem: CVA:L thalamus subacute infarc  Co-Morbidities : DVT    ASSESSMENT   Patient demonstrates good  progress this session, goals  remain in progress.    Patient continues to function below baseline with bed mobility, transfers, gait, maintaining seated position, and standing prolonged periods.  Contributing factors to remaining limitations include decreased functional strength, decreased endurance/aerobic capacity, impaired sitting and standing balance, impaired coordination, and decreased muscular endurance.  Next session anticipate patient to progress bed mobility, transfers, gait, maintaining seated position, and standing prolonged periods.  Physical Therapy will continue to follow patient for duration of hospitalization.    Patient continues to benefit from continued skilled PT services: to facilitate return to prior level of function as patient demonstrates high motivation with excellent tolerance to an intensive therapy program .    PLAN  PT Treatment Plan: Bed mobility;Body mechanics;Coordination;Endurance;Energy conservation;Patient education;Family education;Gait training;Range of motion;Strengthening;Stair training;Transfer training;Balance training  Rehab Potential : Good  Frequency (Obs): 3-5x/week    CURRENT GOALS     Goal #1 Patient is able to demonstrate supine - sit EOB @ level: minimum assistance      Goal #2 Patient is able to demonstrate transfers Sit to/from Stand at assistance level: minimum assistance      Goal #3 Patient is able to ambulate 15 feet with assist device: walker - rolling at assistance level: moderate assistance/min A      Goal #4 Pt will be ind/mod I in HEP for B LE while seated/supine      Goal #5     Goal #6     Goal Comments: Goals established on 4/20/2024 4/21/2024 all goals ongoing.  4/22/2024 all goals ongoing  24     SUBJECTIVE  \"I want to be able to walk and to be myself again.\"     OBJECTIVE       WEIGHT BEARING RESTRICTION  Weight Bearing Restriction: None                PAIN ASSESSMENT   Ratin  Location: patient reporting fatigue vs. pain today       BALANCE                                                                                                                       Static Sitting: Good  Dynamic Sitting: Fair +           Static Standing: Fair  Dynamic Standing: Poor +    ACTIVITY TOLERANCE  Pulse: 88  Heart Rate Source: Monitor  Resp: 17  BP: (!) 179/82  BP Location: Left arm  BP Method: Automatic  Patient Position: Sitting (post-activity, seated in the bedside chair; diaphoretic)    O2 WALK         AM-PAC '6-Clicks' INPATIENT SHORT FORM - BASIC MOBILITY  How much difficulty does the patient currently have...  Patient Difficulty: Turning over in bed (including adjusting bedclothes, sheets and blankets)?: A Lot   Patient Difficulty: Sitting down on and standing up from a chair with arms (e.g., wheelchair, bedside commode, etc.): A Lot   Patient Difficulty: Moving from lying on back to sitting on the side of the bed?: A Lot   How much help from another person does the patient currently need...   Help from Another: Moving to and from a bed to a chair (including a wheelchair)?: A Lot   Help from Another: Need to walk in hospital room?: A Lot   Help from Another: Climbing 3-5 steps with a railing?: Total       AM-PAC Score:  Raw Score: 11   Approx Degree of Impairment: 72.57%   Standardized Score (AM-PAC Scale): 33.86   CMS Modifier (G-Code): CL    FUNCTIONAL ABILITY STATUS  Gait Assessment   Functional Mobility/Gait Assessment  Gait Assistance: Maximum assistance  Distance (ft): 5  Assistive Device: Rolling walker  Pattern:  (step to pattern, narrow base of support, poor R LE kinesthetic and proprioceptive awareness, decreased R hand  on RW)    Skilled Therapy Provided    Bed Mobility:  Rolling:  NT   Supine<>Sit: ModA, to the R, use of bedrail and OT present instructing in use of R UE   Sit<>Supine: NT     Transfer Mobility:  Sit<>Stand: ModA x2 for safety to RW, patient with L shift, verbal and tactile cuing for terminal R knee extension x10 reps; Mod-MaxA for R  on the RW   Stand<>Sit: ModA x2 for safety, RW, verbal cuing for hand placement and alignment to the chair for safety  Gait: x5 ft., RW, ModA x2 and RW for safety - verbal cuing and assist for standing balance, L lateral weightshift for repositioning of his R LE; verbal cuing for upright head and trunk posture    Therapist's Comments: Patient presents to PT supine in bed, highly motivated to work with therapy and feeling increased fatigue today versus yesterday. OT present for safety and dual-tasking activities at times throughout the session. Increased time sitting on the side of the bed, verbal cuing and CGA for trunk postural control, upright head and sitting balance while working on seated B LE marches, LAQ's and ankle pumps. Verbal cuing at tapping at R quad to initiate hip flexion or knee extension dependent on exercise with ModA from PT to complete arc of motion. Poor eccentric control for R knee flexion. Poor R active dorsiflexion/plantarflexion. Patient fatigued and diaphoretic following therex, however able to progress to functional activity - sit > stand, pre-gait skills including B weightshifting, R TKE and repositioning of R LE in standing at RW, then able to progress bed > chair, stand > step transfer - ModA x2. Patient left sitting up in the bedside chair with BLE's propped and pillow placed beneath for patient comfort, R UE propped on pillow, chair alarm donned and all needs placed within reach. Recommend use of Crystal Stedy and x2 person assist for safe nursing mobility. RN updated.       THERAPEUTIC EXERCISES  Lower Extremity Alternating marching  Ankle pumps  LAQ     Upper Extremity See OT note     Position Sitting      Repetitions   10   Sets   1     Patient End of Session: Up in chair;Needs met;Call light within reach;RN aware of session/findings;All patient questions and concerns addressed;Alarm set    PT Session Time: 45 minutes  Gait Training: 15 minutes  Therapeutic Activity: 15 minutes  Therapeutic Exercise: 15 minutes   Neuromuscular Re-education: 0 minutes

## 2024-04-22 NOTE — CONSULTS
Physical Medicine and Rehabilitation Remote Consult Note    Date of Consultation: 04/21/24  Consult Reason: Impaired mobility and activities of daily living dysfunction secondary to acute stroke  Requesting Attending: Braulio Guerrero MD    Prior documentation in the medical record was reviewed and summarized to formulate this remote consult.     DYANA Johnson is a 74 year old male with diabetes presented to the [facility] on 4/19/24 with a 1 day history of right-sided weakness.  He was increasingly noting right lower extremity numbness and decided to come to the hospital.  In the emergency department patient had an MRI of the brain which reported left inferior thalamus infarct and patient was admitted. Neurology was consulted.  MRA of the head and neck reported focal, noncritical stenosis of bilateral PCAs. Echocardiogram did not show any intracardiac thrombi or shunting. Hemoglobin A1c was 12.1, .  Advised 30-day event monitoring for evaluation of occult atrial fibrillation.  Started on DAPT and atorvastatin.     PM&R was consulted for rehab recommendations. Seen and examined. States he has 21 year old grandson, and dtr in law who can provide 24H assistance. Girlfriend just had surgery for breast cancer and has restrictions to the RUE: her assistance is limited. He was previously independent.       RELEVANT HISTORY:  Past Medical History:    Arrhythmia    Diabetes mellitus (HCC)    High blood pressure    Type II or unspecified type diabetes mellitus without mention of complication, not stated as uncontrolled       Past Surgical History:   Procedure Laterality Date    Colonoscopy      2005    Tonsillectomy         Family History   Problem Relation Age of Onset    Ear Problems Father         hearing loss    Bleeding Disorders Sister     Cancer Paternal Grandfather         lung    Cancer Paternal Uncle         lung       Social History  Home Setting: Lives With: Spouse (daughter and family lives in the  basement apartment)   Family/Support:  Social History     Socioeconomic History    Marital status:    Tobacco Use    Smoking status: Never    Smokeless tobacco: Never   Vaping Use    Vaping status: Never Used   Substance and Sexual Activity    Alcohol use: No    Drug use: No     Social Determinants of Health     Food Insecurity: No Food Insecurity (4/20/2024)    Food Insecurity     Food Insecurity: Never true   Transportation Needs: No Transportation Needs (4/20/2024)    Transportation Needs     Lack of Transportation: No    [facility]    Social Connections   Housing Stability: Low Risk  (4/20/2024)    Housing Stability     Housing Instability: No       FUNCTIONAL STATUS:     Premorbid:       Current:  PT documentation on 4/21/24:  Transfer Mobility:  Sit<>Stand: mod assist of two. Repeated 5 times, each time quad support manually, cues for left ue on arm rest.                 Stand<>Sit: cues for L UE placement, mod assist of two.    Gait: RW and mod assist of two. Patient shows poor  on RW with RUE, next session try alex walker.   During gait, cues for step sequence, RW management, manual cues for quad control on RLE to prevent knee buckling, cues for posture awareness, cues and assist for wt shift. Chair follow needed.     Gait Assessment   Functional Mobility/Gait Assessment  Gait Assistance: Maximum assistance  Distance (ft): 10  Assistive Device: Rolling walker  Pattern:  (step to pattern, dec quad control, dec R hand  on the walker)    OT documentation - pending      SLP documentation 4/20/24:    Diet Recommendations - Solids: Regular  Diet Recommendations - Liquids: Thin Liquids     Aspiration Precautions: Upright position;Slow rate;Small bites and sips  Medication Administration Recommendations: No restrictions  Treatment Plan/Recommendations: Communication evaluation     RECOMMENDATIONS/PLAN:  -  Recommend continue current diet with implementation of   swallow support strategies outlined above.   - Cog-com evaluation to be completed per stroke protocol.       ALLERGIES:    No Known Allergies            MEDICATIONS:   Medications reviewed    Current Outpatient Medications:     aspirin 81 MG Oral Chew Tab, Chew 1 tablet (81 mg total) by mouth daily., Disp: 90 tablet, Rfl: 0    atorvastatin 80 MG Oral Tab, Take 1 tablet (80 mg total) by mouth nightly., Disp: 90 tablet, Rfl: 0    clopidogrel 75 MG Oral Tab, Take 1 tablet (75 mg total) by mouth daily., Disp: 90 tablet, Rfl: 0     Vitals:    04/22/24 1230   BP: (!) 179/82   Pulse: 88   Resp: 17   Temp:      Physical Exam:    General: No acute distress.   HEENT:  EOMI, PERRLA, OP clear  Respiratory: Clear to auscultation bilaterally. No wheezes. No rhonchi.  Cardiovascular: S1, S2. Regular rate and rhythm. No murmurs.  Abdomen: Soft, nontender, nondistended.  Positive bowel sounds. No rebound or guarding.  Extremities: 2+ pitting edema BLE  MSK: 30 degrees shoulder abduction. 4/5 strength for EF, 4-/5 R EE.  is 4, unable to perform finger extension, wrist extension to neutral. Full strength on left upper and lower.   Neuro:  CN: visual fields intact. Slight dysarthria. Sensation intact.  Slight right facial droop. Tongue midline. Sensation intact BUE and BLE.     DATA:  Labs:  Recent Labs   Lab 04/19/24  1726 04/21/24  0632   RBC 4.96 4.48   HGB 13.4 12.4*   HCT 40.9 36.7*   MCV 82.5 81.9   MCH 27.0 27.7   MCHC 32.8 33.8   RDW 13.2 13.2   NEPRELIM 6.80 4.02   WBC 9.0 7.0   .0 236.0         Recent Labs   Lab 04/19/24  1726 04/21/24  0632 04/22/24  0525   * 255*  --    BUN 33* 37*  --    CREATSERUM 1.85* 1.76*  --    EGFRCR 38* 40*  --    CA 8.9 8.8  --     140  --    K 4.1 3.8 4.1    109  --    CO2 22.0 25.0  --          Imaging:  MRI BRAIN MRA HEAD+MRA NECK   Result Date: 4/19/2024  CONCLUSION:   Recent subacute infarct with a subcentimeter focus of restricted diffusion, but also showing  increased T2/FLAIR signal indicating subacute nature of the infarct within the anterolateral inferior left thalamus.  Focal stenosis identified involving both right and left proximal posterior cerebral arteries.     XR CHEST AP PORTABLE  Result Date: 4/19/2024  CONCLUSION:    Poor inspiration low lung volumes.  Cardiomegaly.  Left lower lobe patchy atelectasis or infiltrate. No sizable effusion, but small effusion difficult to exclude on portable chest x-ray. No evidence for pneumothorax.  Consider upright PA and lateral examination of the chest, when tolerated.      ASSESSMENT/RECOMMENDATIONS:                                      Rehab diagnosis: Impaired mobility and activities of daily living dysfunction secondary to acute stroke.    Based on patient's current functional status, pt would benefit from acute stroke Rehabilitation, working with PT/OT/SLP to upgrade present functional status, provide family training, assess home equipment and assistive device needs. Will benefit from ryan, armeo, zero G (rehab technology), neuromuscular re-education. Training and fitting for use of solid ankle AFO and ultra light weight w/c   24 hr rehab nursing also beneficial for medication management, pressure sore prevention, bowel and bladder management, skin management.  Physiatric medical oversight to monitor kidney function, cardiac function, pulmonary status, neurologic status, DVT prevention.    Estimated length of stay in recommended rehabilitation level of care is 3 weeks.     The patient has fair potential to achieve the goal to return home at CGA level of function.     Would be happy to reassess should medical and/or functional status change.   Will follow.  Thank you for the consult.    Candy Rodrigues MD  [facility].       Other Recommendations:  - Encourage out of bed to chair TID during meal times    - Encourage patient to transfer to commode or toilet for toileting   - Continue monitoring for  constipation. If develops, consider scheduling bowel meds.   - Please have PT/OT/SLP continue to follow the patient while in house.     Thank you for the opportunity to be involved in the care of this patient. Please feel free to contact me via [employer] with questions or concerns. We will continue to follow with you regarding rehabilitation needs.    Candy Rodrigues MD

## 2024-04-23 LAB
GLUCOSE BLD-MCNC: 146 MG/DL (ref 70–99)
GLUCOSE BLD-MCNC: 189 MG/DL (ref 70–99)
GLUCOSE BLD-MCNC: 203 MG/DL (ref 70–99)
GLUCOSE BLD-MCNC: 253 MG/DL (ref 70–99)

## 2024-04-23 RX ORDER — PSEUDOEPHEDRINE HCL 30 MG
100 TABLET ORAL 2 TIMES DAILY
Status: SHIPPED | COMMUNITY
Start: 2024-04-23

## 2024-04-23 RX ORDER — POLYETHYLENE GLYCOL 3350 17 G/17G
17 POWDER, FOR SOLUTION ORAL DAILY PRN
Status: SHIPPED | COMMUNITY
Start: 2024-04-23

## 2024-04-23 NOTE — OCCUPATIONAL THERAPY NOTE
Attempted to see pt for skilled OT services. Pt reporting poor night's sleep and requesting for therapy to return at a later time. Will follow-up later today as schedule permits.

## 2024-04-23 NOTE — PLAN OF CARE
Assumed care at 1930.   A&Ox4, RA, NSR on tele  No complaints of pain  Qshift neuro, see flowsheets  Call light within reach    Patient updated on plan of care

## 2024-04-23 NOTE — CM/SW NOTE
[facility] to initiate insurance auth this AM. Will follow up with pt/family today to confirm plans.     4:30- insurance auth approved for [facility]. Will need updated CBC/BMP tomorrow AM. Orders in for holter monitor prior to DC.     NADYA Harry

## 2024-04-23 NOTE — PLAN OF CARE
Assumed patient care 0730  Patient alert and oriented X4  On room air, BP elevated within parameters, NSR with 1AVB and BBB on tele   Denies pain  Qshift neuro, no new deficits, see flowsheets   Patient is 1-2 max assist with SS lift to bedside commode and chair  Voiding per urinal, commode, 1 BM this shift  Tolerating diet   Patient educated on need for blood sugar checks  Await discharge to Lisa Hernandez   Patient updated on plan of care    Problem: NEUROLOGICAL - ADULT  Goal: Achieves stable or improved neurological status  Description: INTERVENTIONS  - Assess for and report changes in neurological status  - Initiate measures to prevent increased intracranial pressure  - Maintain blood pressure and fluid volume within ordered parameters to optimize cerebral perfusion and minimize risk of hemorrhage  - Monitor temperature, glucose, and sodium. Initiate appropriate interventions as ordered  Outcome: Progressing  Goal: Absence of seizures  Description: INTERVENTIONS  - Monitor for seizure activity  - Administer anti-seizure medications as ordered  - Monitor neurological status  Outcome: Progressing  Goal: Remains free of injury related to seizure activity  Description: INTERVENTIONS:  - Maintain airway, patient safety  and administer oxygen as ordered  - Monitor patient for seizure activity, document and report duration and description of seizure to MD/LIP  - If seizure occurs, turn patient to side and suction secretions as needed  - Reorient patient post seizure  - Seizure pads on all 4 side rails  - Instruct patient/family to notify RN of any seizure activity  - Instruct patient/family to call for assistance with activity based on assessment  Outcome: Progressing  Goal: Achieves maximal functionality and self care  Description: INTERVENTIONS  - Monitor swallowing and airway patency with patient fatigue and changes in neurological status  - Encourage and assist patient to increase activity and self care with guidance  from PT/OT  - Encourage visually impaired, hearing impaired and aphasic patients to use assistive/communication devices  Outcome: Progressing     Problem: Diabetes/Glucose Control  Goal: Glucose maintained within prescribed range  Description: INTERVENTIONS:  - Monitor Blood Glucose as ordered  - Assess for signs and symptoms of hyperglycemia and hypoglycemia  - Administer ordered medications to maintain glucose within target range  - Assess barriers to adequate nutritional intake and initiate nutrition consult as needed  - Instruct patient on self management of diabetes  Outcome: Progressing

## 2024-04-23 NOTE — PROGRESS NOTES
[facility]   part of [facility] Hospitalist Progress Note     Doug Johnson Patient Status:  Inpatient    1950 MRN [id number]   Location [facility] 7NE-A Attending Herman Samson, DO   Hosp Day # 3 PCP Jean Lunsford MD     Follow Up:  The encounter diagnosis was Acute CVA (cerebrovascular accident) (HCC).    Subjective:     Patient seen and examined.  States his RUE and RLE are still weak but speech improved.  No F/C, N/V.  Awaiting discharge to  acute rehab.  He has a BM this AM.       Objective:    Review of Systems:   10 point ROS completed and was negative, except for pertinent positive and negatives stated in subjective.    Vital signs:  Temp:  [98 °F (36.7 °C)-99.5 °F (37.5 °C)] 98.1 °F (36.7 °C)  Pulse:  [71-88] 77  Resp:  [0-18] 0  BP: (111-179)/(65-90) 137/76  SpO2:  [95 %-97 %] 95 %    Physical Exam:    General: No acute distress.   HEENT:  EOMI, PERRLA, OP clear  Respiratory: Clear to auscultation bilaterally. No wheezes. No rhonchi.  Cardiovascular: S1, S2. Regular rate and rhythm. No murmurs.  Abdomen: Soft, nontender, nondistended.  Positive bowel sounds. No rebound or guarding.  Extremities: No edema.  Neuro:  +RUE and RLE weakness      Diagnostic Data:    Labs:  Recent Labs   Lab 24  1726 24  0632   WBC 9.0 7.0   HGB 13.4 12.4*   MCV 82.5 81.9   .0 236.0   INR 1.00  --        Recent Labs   Lab 24  1726 24  0632 24  0525   * 255*  --    BUN 33* 37*  --    CREATSERUM 1.85* 1.76*  --    CA 8.9 8.8  --    ALB 3.6  --   --     140  --    K 4.1 3.8 4.1    109  --    CO2 22.0 25.0  --    ALKPHO 99  --   --    AST 25  --   --    ALT 23  --   --    BILT 0.4  --   --    TP 7.3  --   --        CrCl cannot be calculated (Unknown ideal weight.).    Recent Labs   Lab 24  1726   PTP 13.2   INR 1.00            COVID-19 Lab Results    COVID-19  No results found for: \"COVID19\"    Pro-Calcitonin  No  results for input(s): \"PCT\" in the last 168 hours.    Cardiac  No results for input(s): \"TROP\", \"PBNP\" in the last 168 hours.    Creatinine Kinase  No results for input(s): \"CK\" in the last 168 hours.    Inflammatory Markers  No results for input(s): \"CRP\", \"TANESHA\", \"LDH\", \"DDIMER\" in the last 168 hours.    Imaging: Imaging data reviewed in Epic.    Medications:    docusate sodium  100 mg Oral BID    insulin degludec  35 Units Subcutaneous Daily    aspirin  81 mg Oral Daily    heparin  5,000 Units Subcutaneous Q8H JUAN JOSE    atorvastatin  80 mg Oral Nightly    clopidogrel  75 mg Oral Daily    insulin aspart  1-68 Units Subcutaneous TID CC    insulin aspart  1-10 Units Subcutaneous TID AC and HS     Assessment & Plan:      74 year old male with PMH sig for DM2, presents to ER for eval R arm and L weakness and numbness.     # acute/subactue CVA anterolateral inferior L thalamus  # focal stenosis of R and L posterior cerebral arteries  # R sided weakness due to CVA  - CVA order set, permissive HTN 48 h, if BP remain elevated tomorrow consider starting anti- HTN such as ace  - asa, plavix, statin  - ECHO nml EF, no clot  - seen by speech and cleared for po  - PT/OT rec acute rehab  - holter on dc (neuro ordered)   - cont normotension per neuro      # DM2, uncontrolled, insulin dependent  - hga1c 12.1.  on humilin u500 70 u qam and 30-40 u q pm  - tresiba 35 u daily, carb ratio 1:7 and medium dose ssi here.    - adjust as needed    # elevated cr  - suspect may be CKD, last cr 2017 1.06.       Plan of care: inpt care.      Plan of care discussed with patient or family at bedside.    Herman Samson, DO    Supplementary Documentation:     Quality:  DVT Prophylaxis: hep subcutaneous   CODE status: FULL  [place]: no  Central line: no  If COVID testing is negative, may discontinue isolation: yes     Estimated date of discharge: likely tomorrow   Discharge is dependent on: clinical course   At this point Mr. Johnson is expected to be  discharge to: acute rehab     Plan of care discussed with pt

## 2024-04-23 NOTE — PHYSICAL THERAPY NOTE
Attempted to see Pt this AM - RN aware of attempt.  Pt reported poor night's sleep - requesting therapy return later today.  Will f/u later today if time permits, after all other patients are attempted per tentative schedule.

## 2024-04-24 LAB
ANION GAP SERPL CALC-SCNC: 5 MMOL/L (ref 0–18)
BASOPHILS # BLD AUTO: 0.02 X10(3) UL (ref 0–0.2)
BASOPHILS NFR BLD AUTO: 0.2 %
BUN BLD-MCNC: 42 MG/DL (ref 9–23)
CALCIUM BLD-MCNC: 8.8 MG/DL (ref 8.5–10.1)
CHLORIDE SERPL-SCNC: 112 MMOL/L (ref 98–112)
CO2 SERPL-SCNC: 23 MMOL/L (ref 21–32)
CREAT BLD-MCNC: 1.96 MG/DL
CREAT UR-SCNC: 140 MG/DL
CREAT UR-SCNC: 140 MG/DL
EGFRCR SERPLBLD CKD-EPI 2021: 35 ML/MIN/1.73M2 (ref 60–?)
EOSINOPHIL # BLD AUTO: 0.24 X10(3) UL (ref 0–0.7)
EOSINOPHIL NFR BLD AUTO: 2.7 %
ERYTHROCYTE [DISTWIDTH] IN BLOOD BY AUTOMATED COUNT: 13.3 %
GLUCOSE BLD-MCNC: 149 MG/DL (ref 70–99)
GLUCOSE BLD-MCNC: 173 MG/DL (ref 70–99)
GLUCOSE BLD-MCNC: 179 MG/DL (ref 70–99)
GLUCOSE BLD-MCNC: 179 MG/DL (ref 70–99)
GLUCOSE BLD-MCNC: 234 MG/DL (ref 70–99)
HCT VFR BLD AUTO: 39 %
HGB BLD-MCNC: 13.2 G/DL
IMM GRANULOCYTES # BLD AUTO: 0.02 X10(3) UL (ref 0–1)
IMM GRANULOCYTES NFR BLD: 0.2 %
LYMPHOCYTES # BLD AUTO: 2.22 X10(3) UL (ref 1–4)
LYMPHOCYTES NFR BLD AUTO: 24.9 %
MCH RBC QN AUTO: 27.3 PG (ref 26–34)
MCHC RBC AUTO-ENTMCNC: 33.8 G/DL (ref 31–37)
MCV RBC AUTO: 80.7 FL
MICROALBUMIN UR-MCNC: 26.4 MG/DL
MICROALBUMIN/CREAT 24H UR-RTO: 188.6 UG/MG (ref ?–30)
MONOCYTES # BLD AUTO: 0.88 X10(3) UL (ref 0.1–1)
MONOCYTES NFR BLD AUTO: 9.9 %
NEUTROPHILS # BLD AUTO: 5.52 X10 (3) UL (ref 1.5–7.7)
NEUTROPHILS # BLD AUTO: 5.52 X10(3) UL (ref 1.5–7.7)
NEUTROPHILS NFR BLD AUTO: 62.1 %
OSMOLALITY SERPL CALC.SUM OF ELEC: 305 MOSM/KG (ref 275–295)
PLATELET # BLD AUTO: 239 10(3)UL (ref 150–450)
POTASSIUM SERPL-SCNC: 4.3 MMOL/L (ref 3.5–5.1)
PROT UR-MCNC: 75.9 MG/DL
PROT/CREAT UR-RTO: 0.54
RBC # BLD AUTO: 4.83 X10(6)UL
SODIUM SERPL-SCNC: 140 MMOL/L (ref 136–145)
WBC # BLD AUTO: 8.9 X10(3) UL (ref 4–11)

## 2024-04-24 RX ORDER — LOSARTAN POTASSIUM 25 MG/1
25 TABLET ORAL DAILY
Status: DISCONTINUED | OUTPATIENT
Start: 2024-04-24 | End: 2024-04-26

## 2024-04-24 RX ORDER — HYDRALAZINE HYDROCHLORIDE 20 MG/ML
10 INJECTION INTRAMUSCULAR; INTRAVENOUS EVERY 6 HOURS PRN
Status: DISCONTINUED | OUTPATIENT
Start: 2024-04-24 | End: 2024-04-26

## 2024-04-24 NOTE — CM/SW NOTE
Pt and dtr made aware of insurance auth approval for [facility]. Per PMR- would prefer BP to be better managed and up trending BUN, Cr. Notified MD and RN.     Will see for DC tomorrow.    NADYA Harry

## 2024-04-24 NOTE — PLAN OF CARE
Assumed care at 1930  A&O x4, RA, NSR w/1st degree heart block  Q shift neuro, No new deficits, See flowsheets for reference.  Awaiting dc to [facility] on 4/24  Patient updated on POC. All questions answered.     Problem: Diabetes/Glucose Control  Goal: Glucose maintained within prescribed range  Description: INTERVENTIONS:  - Monitor Blood Glucose as ordered  - Assess for signs and symptoms of hyperglycemia and hypoglycemia  - Administer ordered medications to maintain glucose within target range  - Assess barriers to adequate nutritional intake and initiate nutrition consult as needed  - Instruct patient on self management of diabetes  Outcome: Progressing    Problem: NEUROLOGICAL - ADULT  Goal: Achieves stable or improved neurological status  Description: INTERVENTIONS  - Assess for and report changes in neurological status  - Initiate measures to prevent increased intracranial pressure  - Maintain blood pressure and fluid volume within ordered parameters to optimize cerebral perfusion and minimize risk of hemorrhage  - Monitor temperature, glucose, and sodium. Initiate appropriate interventions as ordered  Outcome: Progressing  Goal: Absence of seizures  Description: INTERVENTIONS  - Monitor for seizure activity  - Administer anti-seizure medications as ordered  - Monitor neurological status  Outcome: Progressing  Goal: Remains free of injury related to seizure activity  Description: INTERVENTIONS:  - Maintain airway, patient safety  and administer oxygen as ordered  - Monitor patient for seizure activity, document and report duration and description of seizure to MD/LIP  - If seizure occurs, turn patient to side and suction secretions as needed  - Reorient patient post seizure  - Seizure pads on all 4 side rails  - Instruct patient/family to notify RN of any seizure activity  - Instruct patient/family to call for assistance with activity based on assessment  Outcome: Progressing  Goal: Achieves maximal  functionality and self care  Description: INTERVENTIONS  - Monitor swallowing and airway patency with patient fatigue and changes in neurological status  - Encourage and assist patient to increase activity and self care with guidance from PT/OT  - Encourage visually impaired, hearing impaired and aphasic patients to use assistive/communication devices  Outcome: Progressing

## 2024-04-24 NOTE — CONSULTS
[facility] - Nephrology  Inpatient Consultation    Doug Johnson Patient Status:  Inpatient    1950 MRN [id number]   [place] [id number]-A Attending Herman Samson, DO   Hosp Day # 4 PCP Jean Lunsford MD     Reason for consult: CKD stage 3b  Date of consultation: 2024     HISTORY OF PRESENT ILLNESS:     Doug Johnson is a 74 year old male with a medical history notable for hypertension, diabetes, who presented with acute/subacute CVA. Nephrology consulted for CKD management.     Patient noted to have arm weakness and numbness and diagnosed with CVA. Currently with hypertension, not taking any medications. History of CKD per chart review for past couple of years. Never seen nephrology before. Denies any urinary complaints. Denies NSAID use. History of uncontrolled diabetes. Nephrology consulted for further workup and management of CKD.    REVIEW OF SYSTEMS:     ROS as above, otherwise negative    HISTORY:     Past Medical History:    Arrhythmia    Diabetes mellitus (HCC)    High blood pressure    Type II or unspecified type diabetes mellitus without mention of complication, not stated as uncontrolled     Past Surgical History:   Procedure Laterality Date    Colonoscopy          Tonsillectomy       Family History   Problem Relation Age of Onset    Ear Problems Father         hearing loss    Bleeding Disorders Sister     Cancer Paternal Grandfather         lung    Cancer Paternal Uncle         lung      reports that he has never smoked. He has never used smokeless tobacco. He reports that he does not drink alcohol and does not use drugs.  No Known Allergies    MEDICATIONS:       Current Facility-Administered Medications:     hydrALAzine (Apresoline) 20 mg/mL injection 10 mg, 10 mg, Intravenous, Q6H PRN    losartan (Cozaar) tab 25 mg, 25 mg, Oral, Daily    docusate sodium (Colace) cap 100 mg, 100 mg, Oral, BID    polyethylene glycol (PEG 3350) (Miralax) 17 g oral packet 17 g,  17 g, Oral, Daily PRN    magnesium hydroxide (Milk of Magnesia) 400 MG/5ML oral suspension 30 mL, 30 mL, Oral, Daily PRN    bisacodyl (Dulcolax) 10 MG rectal suppository 10 mg, 10 mg, Rectal, Daily PRN    fleet enema (Fleet) 7-19 GM/118ML rectal enema 133 mL, 1 enema, Rectal, Once PRN    insulin degludec 100 units/mL flextouch 35 Units, 35 Units, Subcutaneous, Daily    aspirin chewable tab 81 mg, 81 mg, Oral, Daily    acetaminophen (Tylenol) tab 650 mg, 650 mg, Oral, Q4H PRN **OR** acetaminophen (Tylenol) rectal suppository 650 mg, 650 mg, Rectal, Q4H PRN    labetalol (Trandate) 5 mg/mL injection 10 mg, 10 mg, Intravenous, Q10 Min PRN    ondansetron (Zofran) 4 MG/2ML injection 4 mg, 4 mg, Intravenous, Q6H PRN    metoclopramide (Reglan) 5 mg/mL injection 10 mg, 10 mg, Intravenous, Q8H PRN    heparin (Porcine) 5000 UNIT/ML injection 5,000 Units, 5,000 Units, Subcutaneous, Q8H JUAN JOSE    glucose (Dex4) 15 GM/59ML oral liquid 15 g, 15 g, Oral, Q15 Min PRN **OR** glucose (Glutose) 40% oral gel 15 g, 15 g, Oral, Q15 Min PRN **OR** glucose-vitamin C (Dex-4) chewable tab 4 tablet, 4 tablet, Oral, Q15 Min PRN **OR** dextrose 50% injection 50 mL, 50 mL, Intravenous, Q15 Min PRN **OR** glucose (Dex4) 15 GM/59ML oral liquid 30 g, 30 g, Oral, Q15 Min PRN **OR** glucose (Glutose) 40% oral gel 30 g, 30 g, Oral, Q15 Min PRN **OR** glucose-vitamin C (Dex-4) chewable tab 8 tablet, 8 tablet, Oral, Q15 Min PRN    acetaminophen (Tylenol Extra Strength) tab 500 mg, 500 mg, Oral, Q4H PRN    atorvastatin (Lipitor) tab 80 mg, 80 mg, Oral, Nightly    clopidogrel (Plavix) tab 75 mg, 75 mg, Oral, Daily    insulin aspart (NovoLOG) 100 Units/mL FlexPen 1-68 Units, 1-68 Units, Subcutaneous, TID CC    insulin aspart (NovoLOG) 100 Units/mL FlexPen 1-10 Units, 1-10 Units, Subcutaneous, TID AC and HS    Medications Prior to Admission   Medication Sig Dispense Refill Last Dose    Magnesium Gluconate 500 (27 Mg) MG Oral Tab Take 0.4 tablets (200 mg total)  by mouth daily.   2024 at 2100    HUMULIN R U-500 KWIKPEN 500 UNIT/ML Subcutaneous Solution Pen-injector Inject 0.1 mL (50 Units total) into the skin 3 (three) times daily before meals. Pt takes 70U @1200  30-40u @2200       cholecalciferol 125 MCG (5000 UT) Oral Tab Take 1 tablet (5,000 Units total) by mouth daily.   2024 at 0900    Vitamin K, Phytonadione, 100 MCG Oral Tab Take by mouth.   2024 at 0900        PHYSICAL EXAM:     Vital Signs: /44 (BP Location: Right arm)   Pulse 87   Temp 98.1 °F (36.7 °C) (Oral)   Resp 16   Wt 215 lb (97.5 kg)   SpO2 93%   BMI 30.85 kg/m²   Temp (24hrs), Av.6 °F (36.4 °C), Min:97.3 °F (36.3 °C), Max:98.1 °F (36.7 °C)       Intake/Output Summary (Last 24 hours) at 2024 1427  Last data filed at 2024 1242  Gross per 24 hour   Intake --   Output 875 ml   Net -875 ml     Wt Readings from Last 3 Encounters:   24 215 lb (97.5 kg)   10/07/22 210 lb (95.3 kg)   19 219 lb 4.8 oz (99.5 kg)       General: no acute distress  HEENT: normocephalic, atraumatic  CV: RRR  Respiratory: no respiratory distress  Abdomen: soft, non-tender  Extremities: no edema bilaterally  Skin: no rashes on visible skin  Neuro: alert and oriented x3    LABORATORY DATA:     Lab Results   Component Value Date     (H) 2024    BUN 42 (H) 2024    CREATSERUM 1.96 (H) 2024    ANIONGAP 5 2024    GFR 72 2017    GFRNAA 72 2013    GFRAA 87 2013    CA 8.8 2024    OSMOCALC 305 (H) 2024    ALKPHO 99 2024    AST 25 2024    ALT 23 2024    BILT 0.4 2024    TP 7.3 2024    ALB 3.6 2024    GLOBULIN 3.7 2024    AGRATIO 1.8 2014     2024    K 4.3 2024     2024    CO2 23.0 2024     Lab Results   Component Value Date    WBC 8.9 2024    RBC 4.83 2024    HGB 13.2 2024    HCT 39.0 2024    .0 2024    MPV 10.0  01/01/2013    MCV 80.7 04/24/2024    MCH 27.3 04/24/2024    MCHC 33.8 04/24/2024    RDW 13.3 04/24/2024    NEPRELIM 5.52 04/24/2024    NEPERCENT 62.1 04/24/2024    LYPERCENT 24.9 04/24/2024    MOPERCENT 9.9 04/24/2024    EOPERCENT 2.7 04/24/2024    BAPERCENT 0.2 04/24/2024    NE 5.52 04/24/2024    LYMABS 2.22 04/24/2024    MOABSO 0.88 04/24/2024    EOABSO 0.24 04/24/2024    BAABSO 0.02 04/24/2024     No results found for: \"MALBP\", \"CREUR\", \"CREAURINE\", \"MIALBURINE\", \"MCRRATIOUR\", \"MALBCRECALC\", \"MICROALBUMIN\", \"CREAUR\", \"MALBCREACALC\"  Lab Results   Component Value Date    COLORUR Light-Yellow 04/19/2024    CLARITY Clear 04/19/2024    SPECGRAVITY 1.017 04/19/2024    GLUUR 300 (A) 04/19/2024    BILUR Negative 04/19/2024    KETUR 10 (A) 04/19/2024    BLOODURINE 2+ (A) 04/19/2024    PHURINE 5.5 04/19/2024    PROUR 100 (A) 04/19/2024    UROBILINOGEN Normal 04/19/2024    NITRITE Negative 04/19/2024    LEUUR Negative 04/19/2024    NMIC Microscopic not indicated 03/09/2017    WBCUR 1-5 04/19/2024    RBCUR >10 (A) 04/19/2024    EPIUR None Seen 04/19/2024    BACUR None Seen 04/19/2024       IMAGING:     Reviewed    ASSESSMENT:     # CKD stage 3b  -Baseline creatinine 1.8-2.0  -Urinalysis with glucosuria, proteinuria, hematuria    # Hypertension    PLAN:     -Will start losartan 25mg daily in setting of hypertension, diabetes, CKD  -Monitor kidney function closely - will need outpatient labs in 7-10 days after initiation of losartan  -Will obtain renal ultrasound for structural evaluation  -Will obtain urinalysis, urine protein quantification studies.   -Counseled on adequate fluid intake at least 48oz daily  -Follow-up outpatient with Nephrology - will place through [facility]  -Avoid nephrotoxins and renally dose medications for creatinine clearance  -Monitor intake and output daily  -Daily weights          Okay for discharge from nephrology perspective. Will follow-up outpatient.    Thank you for allowing us to  participate in the care of this patient.         Liza Morton, DO  [facility] - Nephrology

## 2024-04-24 NOTE — PLAN OF CARE
Pt AxO4  RA  NSR  No pain at this time  Max assist with SS    Neurocheck(s)  -see note  NIH daily  Nephrology c/s  -US kidney(pend)  Monitor/manage BG level  Monitor/manage BP level  Transfer to rehab/facility?  Pt informed of POC, all questions answered  -pt family updated    Problem: Diabetes/Glucose Control  Goal: Glucose maintained within prescribed range  Description: INTERVENTIONS:  - Monitor Blood Glucose as ordered  - Assess for signs and symptoms of hyperglycemia and hypoglycemia  - Administer ordered medications to maintain glucose within target range  - Assess barriers to adequate nutritional intake and initiate nutrition consult as needed  - Instruct patient on self management of diabetes  Outcome: Progressing     Problem: NEUROLOGICAL - ADULT  Goal: Achieves stable or improved neurological status  Description: INTERVENTIONS  - Assess for and report changes in neurological status  - Initiate measures to prevent increased intracranial pressure  - Maintain blood pressure and fluid volume within ordered parameters to optimize cerebral perfusion and minimize risk of hemorrhage  - Monitor temperature, glucose, and sodium. Initiate appropriate interventions as ordered  Outcome: Progressing  Goal: Absence of seizures  Description: INTERVENTIONS  - Monitor for seizure activity  - Administer anti-seizure medications as ordered  - Monitor neurological status  Outcome: Progressing  Goal: Remains free of injury related to seizure activity  Description: INTERVENTIONS:  - Maintain airway, patient safety  and administer oxygen as ordered  - Monitor patient for seizure activity, document and report duration and description of seizure to MD/LIP  - If seizure occurs, turn patient to side and suction secretions as needed  - Reorient patient post seizure  - Seizure pads on all 4 side rails  - Instruct patient/family to notify RN of any seizure activity  - Instruct patient/family to call for assistance with activity based on  assessment  Outcome: Progressing  Goal: Achieves maximal functionality and self care  Description: INTERVENTIONS  - Monitor swallowing and airway patency with patient fatigue and changes in neurological status  - Encourage and assist patient to increase activity and self care with guidance from PT/OT  - Encourage visually impaired, hearing impaired and aphasic patients to use assistive/communication devices  Outcome: Progressing

## 2024-04-24 NOTE — OCCUPATIONAL THERAPY NOTE
OCCUPATIONAL THERAPY TREATMENT NOTE - INPATIENT     Room Number: 7624/7624-A  Session: 1  Number of Visits to Meet Established Goals: 7    Presenting Problem: Acute CVA      Prior Level of Function: independent with ADLs and functional mobility without use of adaptive device.  Patient reports no falls     ASSESSMENT   Patient demonstrates good  progress this session, goals remain in progress.    Patient continues to function below baseline with upper body dressing, lower body dressing, bed mobility, and transfers.   Contributing factors to remaining limitations include decreased functional strength, decreased functional reach, decreased endurance, impaired sitting and standing balance, impaired coordination, and limited RUE/RLE active ROM.  Next session anticipate patient to progress grooming, bed mobility, and transfers.  Occupational Therapy will continue to follow patient for duration of hospitalization.    Patient continues to benefit from continued skilled OT services: to facilitate return to prior level of function as patient demonstrates high motivation with excellent tolerance to an intensive therapy program .          OT Device Recommendations: TBD    History: : Patient is a 74 year old male admitted on 4/19/2024 with Presenting Problem: Acute CVA. Co-Morbidities : Ewiiaapaayp, right glenohumeral arthritis, DMII     Imaging  MRI of brain 4/19/24 CONCLUSION:     Recent subacute infarct with a subcentimeter focus of restricted diffusion, but also showing increased T2/FLAIR signal indicating subacute nature of the infarct within the anterolateral inferior left thalamus.  Focal stenosis identified involving both right and left proximal posterior cerebral arteries.     WEIGHT BEARING RESTRICTION  Weight Bearing Restriction: None                Recommendations for nursing staff:   Transfers: Sera Stedy  Toileting location: commode    TREATMENT SESSION:  Patient Start of Session: supine  FUNCTIONAL TRANSFER  ASSESSMENT  Sit to Stand: Edge of Bed; Chair  Edge of Bed: Moderate Assist  Chair: Moderate Assist  Commode Transfer: Moderate Assist    BED MOBILITY  Rolling: Minimal Assist  Supine to Sit : Moderate Assist  Sit to Supine (OT): Not Tested  Scooting: min A    BALANCE ASSESSMENT  Static Sitting: Moderate Assist  Static Standing: Moderate Assist    FUNCTIONAL ADL ASSESSMENT  Eating: Minimal Assist (assist w/ set-up; pt able to feed self with use of left hand)  Grooming Seated: Stand-by Assist (and cues to use modified tech w/ use of left UE as primary)  LB Dressing Seated: Maximum Assist      ACTIVITY TOLERANCE:  Tolerated x2-3 min in standing x 2 reps   Sitting BP:138/50                           O2 SATURATIONS       EDUCATION PROVIDED  Patient: Role of Occupational Therapy; Plan of Care; Functional Transfer Techniques; Fall Prevention; Posture/Positioning; Energy Conservation; Compensatory ADL Techniques; Proper Body Mechanics; UE HEP for ROM  Patient's Response to Education: Verbalized Understanding; Requires Further Education  Family/Caregiver: Role of Occupational Therapy; Plan of Care  Family/Caregiver's Response to Education: Verbalized Understanding      Equipment used: RW, gait belt, hospital bed  Demonstrates functional use, Would benefit from additional trial      Exercises: EDWARDO ALFREDO ex:    Exercises Repetitions Comments   Scapular elevation 10    Scapular retraction 10    Shoulder rolls     Shoulder flexion 10    Shoulder abduction 10    Shoulder internal/external rotation     Forward punch 10    Elbow flexion 10    Elbow extension 10    Forearm pronation/supination 10    Wrist flexion/extension 10    Gross grasp/fist pumps 10    Ankle pumps     Knee extension     Marching 10         Therapist comments: patient requires frequent reassurance that he is doing well.  Patient c/o feeling sleepy and tired d/t no sleep in the hospital, however engaged in all activities presented.  Follows simple motor commands  with increased time and redirecting.  Facilitated RUE WB, AAROM and instruction in positioning when at rest in chair or bed.  Patient and sister were educated on importance of actively using RUE.  Patient will need further education.  Sister verbalized understanding.  Patient instructed in weight shifting, hand placement, body mechanics and safety during sit to stand transfers from EOB, chair and commode.    Patient End of Session: Up in chair;With  staff;Needs met;Call light within reach;RN aware of session/findings;All patient questions and concerns addressed;Alarm set;Family present;Discussed recommendations with /    SUBJECTIVE  \"I'm like a baby.\"  \"This leg is useless.\"  \"Sorry I couldn't help more.\"  (Throughout session)    PAIN ASSESSMENT  Ratin  Location: denies        OBJECTIVE  Precautions:  (SBP <190)    AM-PAC ‘6-Clicks’ Inpatient Daily Activity Short Form  -   Putting on and taking off regular lower body clothing?: A Lot  -   Bathing (including washing, rinsing, drying)?: A Lot  -   Toileting, which includes using toilet, bedpan or urinal? : A Lot  -   Putting on and taking off regular upper body clothing?: A Lot  -   Taking care of personal grooming such as brushing teeth?: A Little  -   Eating meals?: A Little    AM-PAC Score:  Score: 14  Approx Degree of Impairment: 59.67%  Standardized Score (AM-PAC Scale): 33.39    PLAN  OT Treatment Plan: Balance activities;Energy conservation/work simplification techniques;ADL training;Functional transfer training;UE strengthening/ROM;Endurance training;Patient/Family education;Patient/Family training;Equipment eval/education;Fine motor coordination activities;Neuromuscluar reeducation;Compensatory technique education  Rehab Potential : Good  Frequency: 3-5x/week    (ongoing 2024)    ADL Goals   Patient will perform eating: with modified independent and with adaptive equipment PRN  Patient will perform grooming: with setup and  with adaptive equipment PRN  Patient will perform lower body dressing:  with mod assist, while in chair, and with adaptive equipment PRN  Patient will perform toileting: with mod assist     Functional Transfer Goals  Patient will transfer in bed by rolling:  with modified independent  Patient will transfer from bed to chair:  with min assist  Patient will transfer from sit to supine:  with min assist  Patient will transfer from supine to sit:  with min assist  Patient will transfer from sit to stand:  with min assist  Patient will transfer to bedside commode:  with min assist     UE Exercise Program Goal  Patient will be supervision with right SAYDA MONTALVO (home exercise program).     Therapist Goals  Complete PhQ9-MET 4/22/24       OT Session Time: 30 minutes  Self-Care Home Management: 15 minutes  Therapeutic Activity: 15 minutes

## 2024-04-24 NOTE — PROGRESS NOTES
[facility]   part of [facility] Hospitalist Progress Note     Doug Johnson Patient Status:  Inpatient    1950 MRN [id number]   Location [facility] 7NE-A Attending Herman Samson, DO   Hosp Day # 4 PCP Jean Lunsford MD     Follow Up:  The encounter diagnosis was Acute CVA (cerebrovascular accident) (HCC).    Subjective:     Patient seen and examined.  States he is feeling better, strength in RUE and RLE slowly improving.  Feels ready to transfer to  acute rehab.     Objective:    Review of Systems:   10 point ROS completed and was negative, except for pertinent positive and negatives stated in subjective.    Vital signs:  Temp:  [97.3 °F (36.3 °C)-98 °F (36.7 °C)] 97.5 °F (36.4 °C)  Pulse:  [75-93] 75  Resp:  [16-26] 16  BP: (143-199)/() 148/77  SpO2:  [95 %-98 %] 95 %    Physical Exam:    General: No acute distress.   HEENT:  EOMI, PERRLA, OP clear  Respiratory: Clear to auscultation bilaterally. No wheezes. No rhonchi.  Cardiovascular: S1, S2. Regular rate and rhythm. No murmurs.  Abdomen: Soft, nontender, nondistended.  Positive bowel sounds. No rebound or guarding.  Extremities: No edema.  Neuro:  +RUE and RLE weakness      Diagnostic Data:    Labs:  Recent Labs   Lab 24  17224  0632 24  0617   WBC 9.0 7.0 8.9   HGB 13.4 12.4* 13.2   MCV 82.5 81.9 80.7   .0 236.0 239.0   INR 1.00  --   --        Recent Labs   Lab 24  1726 24  0632 24  0525 24  0617   * 255*  --  179*   BUN 33* 37*  --  42*   CREATSERUM 1.85* 1.76*  --  1.96*   CA 8.9 8.8  --  8.8   ALB 3.6  --   --   --     140  --  140   K 4.1 3.8 4.1 4.3    109  --  112   CO2 22.0 25.0  --  23.0   ALKPHO 99  --   --   --    AST 25  --   --   --    ALT 23  --   --   --    BILT 0.4  --   --   --    TP 7.3  --   --   --        CrCl cannot be calculated (Unknown ideal weight.).    Recent Labs   Lab 24  1726   PTP 13.2   INR  1.00            COVID-19 Lab Results    COVID-19  No results found for: \"COVID19\"    Pro-Calcitonin  No results for input(s): \"PCT\" in the last 168 hours.    Cardiac  No results for input(s): \"TROP\", \"PBNP\" in the last 168 hours.    Creatinine Kinase  No results for input(s): \"CK\" in the last 168 hours.    Inflammatory Markers  No results for input(s): \"CRP\", \"TANESHA\", \"LDH\", \"DDIMER\" in the last 168 hours.    Imaging: Imaging data reviewed in Epic.    Medications:    losartan  25 mg Oral Daily    docusate sodium  100 mg Oral BID    insulin degludec  35 Units Subcutaneous Daily    aspirin  81 mg Oral Daily    heparin  5,000 Units Subcutaneous Q8H JUAN JOSE    atorvastatin  80 mg Oral Nightly    clopidogrel  75 mg Oral Daily    insulin aspart  1-68 Units Subcutaneous TID CC    insulin aspart  1-10 Units Subcutaneous TID AC and HS     Assessment & Plan:      74 year old male with PMH sig for DM2, presents to ER for eval R arm and L weakness and numbness.     # acute/subactue CVA anterolateral inferior L thalamus  # focal stenosis of R and L posterior cerebral arteries  # R sided weakness due to CVA  # Essential HTN  - CVA order set, permissive HTN 48 h, if BP remain elevated tomorrow consider starting anti- HTN such as ace  - asa, plavix, statin  - ECHO nml EF, no clot  - seen by speech and cleared for po  - PT/OT rec acute rehab  - holter on dc (neuro ordered)   - cont normotension per neuro.  D/w renal, start losartan 25 mg po daily today.  Repeat BMP in AM.     # DM2, uncontrolled, insulin dependent  - hga1c 12.1.  on humilin u500 70 u qam and 30-40 u q pm  - tresiba 35 u daily, carb ratio 1:7 and medium dose ssi here.    - adjust as needed    # elevated cr, suspected CKD stage 3  - suspect may be CKD, last cr 2017 1.06.    - renal c/s, d/w Dr. Morton      Plan of care: inpt care.      Plan of care discussed with patient or family at bedside.    Herman Samson, DO    Supplementary Documentation:     Quality:  DVT  Prophylaxis: hep subcutaneous   CODE status: FULL  [place]: no  Central line: no  If COVID testing is negative, may discontinue isolation: yes     Estimated date of discharge: likely tomorrow   Discharge is dependent on: clinical course   At this point Mr. Johnson is expected to be discharge to: acute rehab     Plan of care discussed with pt

## 2024-04-25 ENCOUNTER — APPOINTMENT (OUTPATIENT)
Dept: ULTRASOUND IMAGING | Facility: HOSPITAL | Age: 74
End: 2024-04-25
Attending: STUDENT IN AN ORGANIZED HEALTH CARE EDUCATION/TRAINING PROGRAM
Payer: MEDICARE

## 2024-04-25 LAB
ANION GAP SERPL CALC-SCNC: 8 MMOL/L (ref 0–18)
BILIRUB UR QL STRIP.AUTO: NEGATIVE
BUN BLD-MCNC: 53 MG/DL (ref 9–23)
CALCIUM BLD-MCNC: 8.8 MG/DL (ref 8.5–10.1)
CHLORIDE SERPL-SCNC: 111 MMOL/L (ref 98–112)
CLARITY UR REFRACT.AUTO: CLEAR
CO2 SERPL-SCNC: 21 MMOL/L (ref 21–32)
CREAT BLD-MCNC: 2.08 MG/DL
EGFRCR SERPLBLD CKD-EPI 2021: 33 ML/MIN/1.73M2 (ref 60–?)
GLUCOSE BLD-MCNC: 176 MG/DL (ref 70–99)
GLUCOSE BLD-MCNC: 178 MG/DL (ref 70–99)
GLUCOSE BLD-MCNC: 188 MG/DL (ref 70–99)
GLUCOSE BLD-MCNC: 202 MG/DL (ref 70–99)
GLUCOSE BLD-MCNC: 266 MG/DL (ref 70–99)
GLUCOSE UR STRIP.AUTO-MCNC: 70 MG/DL
KETONES UR STRIP.AUTO-MCNC: NEGATIVE MG/DL
LEUKOCYTE ESTERASE UR QL STRIP.AUTO: NEGATIVE
NITRITE UR QL STRIP.AUTO: NEGATIVE
OSMOLALITY SERPL CALC.SUM OF ELEC: 309 MOSM/KG (ref 275–295)
PH UR STRIP.AUTO: 5 [PH] (ref 5–8)
POTASSIUM SERPL-SCNC: 4.2 MMOL/L (ref 3.5–5.1)
PROT UR STRIP.AUTO-MCNC: 50 MG/DL
RBC UR QL AUTO: NEGATIVE
SODIUM SERPL-SCNC: 140 MMOL/L (ref 136–145)
SP GR UR STRIP.AUTO: 1.02 (ref 1–1.03)
UROBILINOGEN UR STRIP.AUTO-MCNC: NORMAL MG/DL

## 2024-04-25 PROCEDURE — 76770 US EXAM ABDO BACK WALL COMP: CPT | Performed by: STUDENT IN AN ORGANIZED HEALTH CARE EDUCATION/TRAINING PROGRAM

## 2024-04-25 RX ORDER — LOSARTAN POTASSIUM 25 MG/1
25 TABLET ORAL DAILY
Status: SHIPPED | COMMUNITY
Start: 2024-04-26

## 2024-04-25 NOTE — PROGRESS NOTES
[facility]   part of [facility] Hospitalist Progress Note     Doug Johnson Patient Status:  Inpatient    1950 MRN [id number]   Location [facility] 7NE-A Attending Herman Samson, DO   Hosp Day # 5 PCP Jean Lunsford MD     Follow Up:  The encounter diagnosis was Acute CVA (cerebrovascular accident) (HCC).    Subjective:     Patient seen and examined.  Feeling better, does c/o some coughing with eating, awaiting repeat speech eval.  Otherwise feels stronger.    Objective:    Review of Systems:   10 point ROS completed and was negative, except for pertinent positive and negatives stated in subjective.    Vital signs:  Temp:  [97.8 °F (36.6 °C)-98 °F (36.7 °C)] 97.8 °F (36.6 °C)  Pulse:  [68-87] 68  Resp:  [16-19] 16  BP: (112-144)/(50-80) 132/80  SpO2:  [94 %-100 %] 100 %    Physical Exam:    General: No acute distress.   HEENT:  EOMI, PERRLA, OP clear  Respiratory: Clear to auscultation bilaterally. No wheezes. No rhonchi.  Cardiovascular: S1, S2. Regular rate and rhythm. No murmurs.  Abdomen: Soft, nontender, nondistended.  Positive bowel sounds. No rebound or guarding.  Extremities: No edema.  Neuro:  +RUE and RLE weakness      Diagnostic Data:    Labs:  Recent Labs   Lab 24  17224  0632 24  0617   WBC 9.0 7.0 8.9   HGB 13.4 12.4* 13.2   MCV 82.5 81.9 80.7   .0 236.0 239.0   INR 1.00  --   --        Recent Labs   Lab 24  1726 24  0632 24  0525 24  0617 24  0537   * 255*  --  179* 188*   BUN 33* 37*  --  42* 53*   CREATSERUM 1.85* 1.76*  --  1.96* 2.08*   CA 8.9 8.8  --  8.8 8.8   ALB 3.6  --   --   --   --     140  --  140 140   K 4.1 3.8 4.1 4.3 4.2    109  --  112 111   CO2 22.0 25.0  --  23.0 21.0   ALKPHO 99  --   --   --   --    AST 25  --   --   --   --    ALT 23  --   --   --   --    BILT 0.4  --   --   --   --    TP 7.3  --   --   --   --        CrCl cannot be calculated  (Unknown ideal weight.).    Recent Labs   Lab 04/19/24  1726   PTP 13.2   INR 1.00            COVID-19 Lab Results    COVID-19  No results found for: \"COVID19\"    Pro-Calcitonin  No results for input(s): \"PCT\" in the last 168 hours.    Cardiac  No results for input(s): \"TROP\", \"PBNP\" in the last 168 hours.    Creatinine Kinase  No results for input(s): \"CK\" in the last 168 hours.    Inflammatory Markers  No results for input(s): \"CRP\", \"TANESHA\", \"LDH\", \"DDIMER\" in the last 168 hours.    Imaging: Imaging data reviewed in Epic.    Medications:    insulin aspart  1-10 Units Subcutaneous Q8H    losartan  25 mg Oral Daily    docusate sodium  100 mg Oral BID    insulin degludec  35 Units Subcutaneous Daily    aspirin  81 mg Oral Daily    heparin  5,000 Units Subcutaneous Q8H JUAN JOSE    atorvastatin  80 mg Oral Nightly    clopidogrel  75 mg Oral Daily     Assessment & Plan:      74 year old male with PMH sig for DM2, presents to ER for eval R arm and L weakness and numbness.     # acute/subactue CVA anterolateral inferior L thalamus  # focal stenosis of R and L posterior cerebral arteries  # R sided weakness due to CVA  # Essential HTN  - CVA order set, permissive HTN 48 h, if BP remain elevated tomorrow consider starting anti- HTN such as ace  - asa, plavix, statin  - ECHO nml EF, no clot  - seen by speech and cleared for po  - PT/OT rec acute rehab  - holter on dc (neuro ordered)   - cont normotension per neuro.  D/w renal, start losartan 25 mg po daily today.  Monitor BMP as outpt     # DM2, uncontrolled, insulin dependent  - hga1c 12.1.  on humilin u500 70 u qam and 30-40 u q pm  - tresiba 35 u daily, carb ratio 1:7 and medium dose ssi here.    - adjust as needed    # elevated cr, suspected CKD stage 3  - suspect may be CKD, last cr 2017 1.06.    - renal c/s, d/w Dr. Morton   - monitor as outpt      Plan of care: inpt care.      Plan of care discussed with patient or family at bedside.    Herman Samson,  DO    Supplementary Documentation:     Quality:  DVT Prophylaxis: hep subcutaneous   CODE status: FULL  [place]: no  Central line: no  If COVID testing is negative, may discontinue isolation: yes     Estimated date of discharge: later today vs tomorrow   Discharge is dependent on: clinical course   At this point Mr. Johnson is expected to be discharge to: acute rehab     Plan of care discussed with pt

## 2024-04-25 NOTE — PHYSICAL THERAPY NOTE
PHYSICAL THERAPY TREATMENT NOTE - INPATIENT    Room Number: 7624/7624-A     Session: 4     Number of Visits to Meet Established Goals: 5    Presenting Problem: CVA:L thalamus subacute infarc  Co-Morbidities : [place], right glenohumeral arthritis, DMII    History related to current admission: Patient is a 74 year old male admitted on 4/19/2024 from home for R side weakness. Pt diagnosed with recent subacute infarct within the anterolateral inferior left thalamus.     HOME SITUATION  Type of Home: House   Home Layout: Two level  Stairs to Enter : 2  Stairs to Bedroom: 14     Lives With: Spouse (daughter and family lives in the basement apartment)  Drives: Yes  Prior Level of [place]: Ind in all aspect of mobilities s any use of an A.D.    ASSESSMENT   Patient demonstrates fair progress this session, goals  remain in progress.    Patient continues to function below baseline with bed mobility, transfers, gait, stair negotiation, maintaining seated position, and standing prolonged periods.  Contributing factors to remaining limitations include decreased functional strength, decreased endurance/aerobic capacity, impaired sitting and standing balance, impaired coordination, impaired motor planning, decreased muscular endurance, medical status, and decreased compliance/participation.  Next session anticipate patient to progress bed mobility, transfers, gait, maintaining seated position, and standing prolonged periods.  Physical Therapy will continue to follow patient for duration of hospitalization.     Patient continues to benefit from continued skilled PT services: to facilitate return to prior level of function as patient demonstrates high motivation with excellent tolerance to an intensive therapy program .    PLAN  PT Treatment Plan: Bed mobility;Body mechanics;Coordination;Endurance;Energy conservation;Patient education;Family education;Gait training;Range of motion;Strengthening;Stair training;Transfer  training;Balance training  Rehab Potential : Good  Frequency (Obs): 3-5x/week    CURRENT GOALS   Goal #1 Patient is able to demonstrate supine - sit EOB @ level: supervision assistance   MET at [place]    Goal #2 Patient is able to demonstrate transfers Sit to/from Stand at assistance level: minimum assistance      Goal #3 Patient is able to ambulate 15 feet with assist device: walker - rolling at assistance level: moderate assistance/min A      Goal #4 Pt will be ind/mod I in HEP for B LE while seated/supine      Goal #5     Goal #6     Goal Comments: Goals established on 2024 all goals ongoing     SUBJECTIVE  \"I like Mark Dimas\"    OBJECTIVE  Precautions:  (SBP <190)    WEIGHT BEARING RESTRICTION  Weight Bearing Restriction: None                PAIN ASSESSMENT   Ratin  Location: patient reporting fatigue vs. pain today       BALANCE                                                                                                                       Static Sitting: Poor +  Dynamic Sitting: Poor +           Static Standing: Poor +  Dynamic Standing: Poor    ACTIVITY TOLERANCE                         O2 WALK         AM-PAC '6-Clicks' INPATIENT SHORT FORM - BASIC MOBILITY  How much difficulty does the patient currently have...  Patient Difficulty: Turning over in bed (including adjusting bedclothes, sheets and blankets)?: A Little   Patient Difficulty: Sitting down on and standing up from a chair with arms (e.g., wheelchair, bedside commode, etc.): A Little   Patient Difficulty: Moving from lying on back to sitting on the side of the bed?: A Little   How much help from another person does the patient currently need...   Help from Another: Moving to and from a bed to a chair (including a wheelchair)?: A Little   Help from Another: Need to walk in hospital room?: A Lot   Help from Another: Climbing 3-5 steps with a railing?: A Lot       AM-PAC Score:  Raw Score: 16   Approx Degree of  Impairment: 54.16%   Standardized Score (AM-PAC Scale): 40.78   CMS Modifier (G-Code): CK    FUNCTIONAL ABILITY STATUS  Gait Assessment   Functional Mobility/Gait Assessment  Gait Assistance: Maximum assistance  Distance (ft): 10, 10, 10, 15, 15,  Assistive Device: Rolling walker (Walking pants)  Pattern: Ataxic;R Foot drag    Skilled Therapy Provided    Gait Training:   Bed removed from pt room. Performed ambulation with overhead lift and walking pants. - performed anterior walking 10ft x3, ambulated 15ft with turns x2   Static standing with RLE AP tapping to promote sagittal plane movement - challenged with anterior swing  Pt req assist with R dorsiflexion and anterior translation of foot during swing phase  Performed sequencing of gait with pt verbalizing \"walker, R leg, L leg\", pt req frequent correction as he often performs and verbalizes LLE first  PT positioned on ground assisting with RLE swing phase, RW resistance provided by PT's R shoulder  Pt's R hand wrapped onto RW with ace bandage    Therapeutic Activity:   Lateral scooting transfer to L bed>chair with Martha for knee block and balance  Pt cued on placement of UE and LEs for optimal force generation and safe STS transfer, LUE to push from arm rest  Pt cued on usage of arm rests for controlled descent into sitting - LUE to reach back for L arm rest  Sit to stand x 5 throughout session      Bed Mobility:  Rolling: NT   Supine<>Sit: CGA   Sit<>Supine: NT    Transfer Mobility:  Sit<>Stand: min-modA   Stand<>Sit: min-modA   Gait: maxA (walking pants)    Therapist's Comments: RN cleared for session. Pt agreeable for therapy, received supine. Sister present for session. Instructed to call for nursing staff for any needs and OOB mobility.     Patient End of Session: Up in chair;Needs met;Call light within reach;RN aware of session/findings;All patient questions and concerns addressed;Alarm set;Family present    PT Session Time: 45 minutes  Gait Trainin  minutes  Therapeutic Activity: 15 minutes

## 2024-04-25 NOTE — CM/SW NOTE
FRANCISCO JAVIER notified by RN of need for renal ultrasound prior to DC. Chart reviewed, noted SLP to see. FRANCISCO JAVIER notified [facility] liaison.     SW to remain available to coordinate DC to [facility] once medically cleared by all consults.     NADYA Harry

## 2024-04-25 NOTE — OCCUPATIONAL THERAPY NOTE
OCCUPATIONAL THERAPY TREATMENT NOTE - INPATIENT     Room Number: 7624/7624-A  Session: 2  Number of Visits to Meet Established Goals: 7    Presenting Problem: Acute CVA      Prior Level of Function: independent with ADLs and functional mobility without use of adaptive device.  Patient reports no falls     ASSESSMENT   Patient demonstrates good  progress this session, goals remain in progress.    Patient continues to function below baseline with upper body dressing, lower body dressing, bed mobility, and transfers.   Contributing factors to remaining limitations include decreased functional strength, decreased functional reach, decreased endurance, impaired sitting and standing balance, impaired coordination, and limited RUE/RLE active ROM.  Next session anticipate patient to progress grooming, bed mobility, and transfers.  Occupational Therapy will continue to follow patient for duration of hospitalization.    Patient continues to benefit from continued skilled OT services: to facilitate return to prior level of function as patient demonstrates high motivation with excellent tolerance to an intensive therapy program .          OT Device Recommendations: TBD    History: : Patient is a 74 year old male admitted on 4/19/2024 with Presenting Problem: Acute CVA. Co-Morbidities : [place], right glenohumeral arthritis, DMII     Imaging  MRI of brain 4/19/24 CONCLUSION:     Recent subacute infarct with a subcentimeter focus of restricted diffusion, but also showing increased T2/FLAIR signal indicating subacute nature of the infarct within the anterolateral inferior left thalamus.  Focal stenosis identified involving both right and left proximal posterior cerebral arteries.     WEIGHT BEARING RESTRICTION  Weight Bearing Restriction: None                Recommendations for nursing staff:   Transfers: Sera Stedy  Toileting location: commode    TREATMENT SESSION:  Patient Start of Session: supine  FUNCTIONAL TRANSFER  ASSESSMENT  Sit to Stand: Edge of Bed; Chair  Edge of Bed: Minimal Assist  Chair: Minimal Assist  Commode Transfer: Moderate Assist    BED MOBILITY  Rolling: Minimal Assist  Supine to Sit : Minimal Assist  Sit to Supine (OT): Not Tested  Scooting: CGA    BALANCE ASSESSMENT  Static Sitting: Stand-by Assist  Static Standing: Minimal Assist  Standing Unilateral: Moderate Assist    FUNCTIONAL ADL ASSESSMENT  Eating: Minimal Assist (assist w/ set-up; pt able to feed self with use of left hand)  Grooming Seated: Stand-by Assist (and cues to use modified tech w/ use of left UE as primary)  LB Dressing Seated: Maximum Assist  Toileting Seated: Maximum Assist  Toileting Standing: Maximum Assist      ACTIVITY TOLERANCE:  Tolerated standing and walking x 4 min x 6 reps w/ min c/o fatigue.      Sitting BP:140/67      EDUCATION PROVIDED  Patient: Role of Occupational Therapy; Plan of Care; Discharge Recommendations; Adaptive Equipment Recommendations; Functional Transfer Techniques; Fall Prevention; Energy Conservation; Compensatory ADL Techniques; Proper Body Mechanics  Patient's Response to Education: Verbalized Understanding; Requires Further Education  Family/Caregiver: Role of Occupational Therapy; Plan of Care; Discharge Recommendations; Fall Prevention; Energy Conservation; Compensatory ADL Techniques; Proper Body Mechanics; UE HEP for Strengthening  Family/Caregiver's Response to Education: Verbalized Understanding; Requires Further Education      Equipment used: RW, gait belt, hospital bed  Demonstrates functional use, Would benefit from additional trial      Exercises: EDWARDO ALFREDO ex:    Exercises Repetitions Comments   Scapular elevation 10    Scapular retraction 10    Shoulder rolls     Shoulder flexion 10    Shoulder abduction 10    Shoulder internal/external rotation     Forward punch 10    Elbow flexion 10    Elbow extension 10    Forearm pronation/supination 10    Wrist flexion/extension 10    Gross grasp/fist  pumps 10    Ankle pumps     Knee extension     Marching 10         Therapist comments: patient requires frequent reassurance that he is doing well.  Pt readily engaged in all tasks presented with frequent redirection.  Facilitated RUE WB, AAROM and instruction in positioning when at rest in chair or bed.  Patient and sister were educated on importance of actively using RUE.  Patient will need further education.  Sister verbalized understanding.  Patient instructed in weight shifting, hand placement, body mechanics and safety during sit to stand transfers from EOB, chair and commode.    Patient End of Session: Up in chair;With  staff;Needs met;Call light within reach;RN aware of session/findings;All patient questions and concerns addressed;Alarm set;Family present;Discussed recommendations with /    SUBJECTIVE  \"I need to do more.\"  \"I can't do too much.\"    PAIN ASSESSMENT  Ratin  Location: denies        OBJECTIVE  Precautions:  (SBP <190)    AM-PAC ‘6-Clicks’ Inpatient Daily Activity Short Form  -   Putting on and taking off regular lower body clothing?: A Lot  -   Bathing (including washing, rinsing, drying)?: A Lot  -   Toileting, which includes using toilet, bedpan or urinal? : A Lot  -   Putting on and taking off regular upper body clothing?: A Lot  -   Taking care of personal grooming such as brushing teeth?: A Little  -   Eating meals?: A Little    AM-PAC Score:  Score: 14  Approx Degree of Impairment: 59.67%  Standardized Score (AM-PAC Scale): 33.39    PLAN  OT Treatment Plan: Balance activities;Energy conservation/work simplification techniques;ADL training;Functional transfer training;UE strengthening/ROM;Endurance training;Patient/Family education;Patient/Family training;Equipment eval/education;Fine motor coordination activities;Neuromuscluar reeducation;Compensatory technique education  Rehab Potential : Good  Frequency: 3-5x/week    (ongoing 2024)    ADL Goals    Patient will perform eating: with modified independent and with adaptive equipment PRN  Patient will perform grooming: with setup and with adaptive equipment PRN  Patient will perform lower body dressing:  with mod assist, while in chair, and with adaptive equipment PRN  Patient will perform toileting: with mod assist     Functional Transfer Goals  Patient will transfer in bed by rolling:  with modified independent  Patient will transfer from bed to chair:  with min assist  Patient will transfer from sit to supine:  with min assist  Patient will transfer from supine to sit:  with min assist  Patient will transfer from sit to stand:  with min assist  Patient will transfer to bedside commode:  with min assist     UE Exercise Program Goal  Patient will be supervision with right SAYDA OMNTALVO (home exercise program).     Therapist Goals  Complete PhQ9-MET 4/22/24       OT Session Time: 30 minutes  Self-Care Home Management: 15 minutes  Therapeutic Activity: 15 minutes

## 2024-04-25 NOTE — PLAN OF CARE
Pt AxO4  RA  NSR  No pain at this time  Max assist with SS    Neurocheck  -see note  NIH daily  US kidney completed  Monitor/manage BG level  Monitor/manage BP level  Transfer to rehab/facility?  Pt informed of POC, all questions answered  -pt family updated    Problem: Diabetes/Glucose Control  Goal: Glucose maintained within prescribed range  Description: INTERVENTIONS:  - Monitor Blood Glucose as ordered  - Assess for signs and symptoms of hyperglycemia and hypoglycemia  - Administer ordered medications to maintain glucose within target range  - Assess barriers to adequate nutritional intake and initiate nutrition consult as needed  - Instruct patient on self management of diabetes  Outcome: Progressing     Problem: NEUROLOGICAL - ADULT  Goal: Achieves stable or improved neurological status  Description: INTERVENTIONS  - Assess for and report changes in neurological status  - Initiate measures to prevent increased intracranial pressure  - Maintain blood pressure and fluid volume within ordered parameters to optimize cerebral perfusion and minimize risk of hemorrhage  - Monitor temperature, glucose, and sodium. Initiate appropriate interventions as ordered  Outcome: Progressing  Goal: Absence of seizures  Description: INTERVENTIONS  - Monitor for seizure activity  - Administer anti-seizure medications as ordered  - Monitor neurological status  Outcome: Progressing  Goal: Remains free of injury related to seizure activity  Description: INTERVENTIONS:  - Maintain airway, patient safety  and administer oxygen as ordered  - Monitor patient for seizure activity, document and report duration and description of seizure to MD/LIP  - If seizure occurs, turn patient to side and suction secretions as needed  - Reorient patient post seizure  - Seizure pads on all 4 side rails  - Instruct patient/family to notify RN of any seizure activity  - Instruct patient/family to call for assistance with activity based on  assessment  Outcome: Progressing  Goal: Achieves maximal functionality and self care  Description: INTERVENTIONS  - Monitor swallowing and airway patency with patient fatigue and changes in neurological status  - Encourage and assist patient to increase activity and self care with guidance from PT/OT  - Encourage visually impaired, hearing impaired and aphasic patients to use assistive/communication devices  Outcome: Progressing

## 2024-04-25 NOTE — PROGRESS NOTES
[facility] - Nephrology  Inpatient Follow-up    Doug Johnson Patient Status:  Inpatient    1950 MRN [id number]   [facility] 7NE-A Attending Herman Samson, DO   Hosp Day # 5 PCP Jean Lunsford MD       SUBJECTIVE:     Patient seen and examined at bedside. No acute complaints today.     MEDICATIONS:     Current Facility-Administered Medications   Medication Dose Route Frequency    insulin aspart (NovoLOG) 100 Units/mL FlexPen 1-10 Units  1-10 Units Subcutaneous Q8H    hydrALAzine (Apresoline) 20 mg/mL injection 10 mg  10 mg Intravenous Q6H PRN    losartan (Cozaar) tab 25 mg  25 mg Oral Daily    docusate sodium (Colace) cap 100 mg  100 mg Oral BID    polyethylene glycol (PEG 3350) (Miralax) 17 g oral packet 17 g  17 g Oral Daily PRN    magnesium hydroxide (Milk of Magnesia) 400 MG/5ML oral suspension 30 mL  30 mL Oral Daily PRN    bisacodyl (Dulcolax) 10 MG rectal suppository 10 mg  10 mg Rectal Daily PRN    fleet enema (Fleet) 7-19 GM/118ML rectal enema 133 mL  1 enema Rectal Once PRN    insulin degludec 100 units/mL flextouch 35 Units  35 Units Subcutaneous Daily    aspirin chewable tab 81 mg  81 mg Oral Daily    acetaminophen (Tylenol) tab 650 mg  650 mg Oral Q4H PRN    Or    acetaminophen (Tylenol) rectal suppository 650 mg  650 mg Rectal Q4H PRN    labetalol (Trandate) 5 mg/mL injection 10 mg  10 mg Intravenous Q10 Min PRN    ondansetron (Zofran) 4 MG/2ML injection 4 mg  4 mg Intravenous Q6H PRN    metoclopramide (Reglan) 5 mg/mL injection 10 mg  10 mg Intravenous Q8H PRN    heparin (Porcine) 5000 UNIT/ML injection 5,000 Units  5,000 Units Subcutaneous Q8H JUAN JOSE    glucose (Dex4) 15 GM/59ML oral liquid 15 g  15 g Oral Q15 Min PRN    Or    glucose (Glutose) 40% oral gel 15 g  15 g Oral Q15 Min PRN    Or    glucose-vitamin C (Dex-4) chewable tab 4 tablet  4 tablet Oral Q15 Min PRN    Or    dextrose 50% injection 50 mL  50 mL Intravenous Q15 Min PRN    Or    glucose (Dex4) 15  GM/59ML oral liquid 30 g  30 g Oral Q15 Min PRN    Or    glucose (Glutose) 40% oral gel 30 g  30 g Oral Q15 Min PRN    Or    glucose-vitamin C (Dex-4) chewable tab 8 tablet  8 tablet Oral Q15 Min PRN    acetaminophen (Tylenol Extra Strength) tab 500 mg  500 mg Oral Q4H PRN    atorvastatin (Lipitor) tab 80 mg  80 mg Oral Nightly    clopidogrel (Plavix) tab 75 mg  75 mg Oral Daily       PHYSICAL EXAM:     Vital Signs: /67 (BP Location: Right arm)   Pulse 80   Temp 97.5 °F (36.4 °C) (Temporal)   Resp 26   Wt 215 lb (97.5 kg)   SpO2 96%   BMI 30.85 kg/m²   Temp (24hrs), Av.9 °F (36.6 °C), Min:97.5 °F (36.4 °C), Max:98 °F (36.7 °C)     No intake or output data in the 24 hours ending 24 1510  Wt Readings from Last 3 Encounters:   24 215 lb (97.5 kg)   10/07/22 210 lb (95.3 kg)   19 219 lb 4.8 oz (99.5 kg)       General: no acute distress  HEENT: normocephalic, atraumatic  CV: RRR  Respiratory: no distress  Abdomen: soft, non-tender  Extremities: no edema bilaterally  Skin: no rashes on visible skin  Neuro: alert and oriented x3     LABORATORY DATA:     Lab Results   Component Value Date     (H) 2024    BUN 53 (H) 2024    CREATSERUM 2.08 (H) 2024    ANIONGAP 8 2024    GFR 72 2017    GFRNAA 72 2013    GFRAA 87 2013    CA 8.8 2024    OSMOCALC 309 (H) 2024    ALKPHO 99 2024    AST 25 2024    ALT 23 2024    BILT 0.4 2024    TP 7.3 2024    ALB 3.6 2024    GLOBULIN 3.7 2024    AGRATIO 1.8 2014     2024    K 4.2 2024     2024    CO2 21.0 2024     Lab Results   Component Value Date    WBC 8.9 2024    RBC 4.83 2024    HGB 13.2 2024    HCT 39.0 2024    .0 2024    MPV 10.0 2013    MCV 80.7 2024    MCH 27.3 2024    MCHC 33.8 2024    RDW 13.3 2024    NEPRELIM 5.52 2024    NEPERCENT 62.1  04/24/2024    LYPERCENT 24.9 04/24/2024    MOPERCENT 9.9 04/24/2024    EOPERCENT 2.7 04/24/2024    BAPERCENT 0.2 04/24/2024    NE 5.52 04/24/2024    LYMABS 2.22 04/24/2024    MOABSO 0.88 04/24/2024    EOABSO 0.24 04/24/2024    BAABSO 0.02 04/24/2024     Lab Results   Component Value Date    MALBP 26.40 04/24/2024    CREUR 140.00 04/24/2024    CREUR 140.00 04/24/2024     Lab Results   Component Value Date    COLORUR Light-Yellow 04/24/2024    CLARITY Clear 04/24/2024    SPECGRAVITY 1.021 04/24/2024    GLUUR 70 (A) 04/24/2024    BILUR Negative 04/24/2024    KETUR Negative 04/24/2024    BLOODURINE Negative 04/24/2024    PHURINE 5.0 04/24/2024    PROUR 50 (A) 04/24/2024    UROBILINOGEN Normal 04/24/2024    NITRITE Negative 04/24/2024    LEUUR Negative 04/24/2024    NMIC Microscopic not indicated 03/09/2017    WBCUR 1-5 04/24/2024    RBCUR 0-2 04/24/2024    EPIUR None Seen 04/24/2024    BACUR None Seen 04/24/2024       IMAGING:     Reviewed    ASSESSMENT:      # CKD stage 3b  -Baseline creatinine 1.8-2.0  -Urinalysis with glucosuria, proteinuria, hematuria     # Hypertension     PLAN:      -Continue losartan 25mg daily in setting of hypertension, diabetes, CKD  -Monitor kidney function closely - will need outpatient labs in 7-10 days after initiation of losartan  -Will obtain renal ultrasound for structural evaluation - may obtain outpatient if needed  -Counseled on adequate fluid intake at least 48oz daily  -Follow-up outpatient with Nephrology - will place through [facility]  -Avoid nephrotoxins and renally dose medications for creatinine clearance  -Monitor intake and output daily  -Daily weights           We will continue to follow.    Thank you for allowing us to participate in the care of this patient.       Liza Morton DO   [facility] - Nephrology

## 2024-04-25 NOTE — SLP NOTE
ADULT SWALLOWING EVALUATION    ASSESSMENT    ASSESSMENT/OVERALL IMPRESSION:  Patient is a 73 y/o male known to this service for prior dysphagia assessment and management. Clinical swallow evaluation completed 4/20 and recommended a regular diet and thin liquids. New SLP order received to re-evaluate given presence of coughing yesterday. Patient reported long history of occasional gagging with PO intake. He denied any recent pneumonia or unintended weight loss.    Patient presented with an intact oropharyngeal swallow. Bolus acceptance was adequate without evidence of anterior bolus loss. Mastication and AP bolus transit were thorough and efficient without evidence of oral residue. Pharyngeal swallow initiation appeared timely and hyolaryngeal excursion was adequate per palpation.  No overt s/s of aspiration observed and patient denied odynophagia and globus sensation across consistencies.     Recommend patient continue a regular diet and thin liquids. Bolus size and rate of intake should be limited. Patient should be in an upright seated position for all PO intake. Education provided re: aspiration precautions. SLP will continue to follow to monitor diet tolerance and adjust as appropriate.          RECOMMENDATIONS   Diet Recommendations - Solids: Regular  Diet Recommendations - Liquids: Thin Liquids                        Compensatory Strategies Recommended: Small bites and sips  Aspiration Precautions: Upright position  Medication Administration Recommendations: No restrictions  Treatment Plan/Recommendations: Communication evaluation    HISTORY   MEDICAL HISTORY  Reason for Referral: Stroke protocol    Problem List  Principal Problem:    Acute CVA (cerebrovascular accident) (HCC)      Past Medical History  Past Medical History:    Arrhythmia    Diabetes mellitus (HCC)    High blood pressure    Type II or unspecified type diabetes mellitus without mention of complication, not stated as uncontrolled       Prior  Living Situation: Home with spouse  Diet Prior to Admission: Regular;Thin liquids  Precautions: None    Patient/Family Goals: none stated    SWALLOWING HISTORY  Current Diet Consistency: Regular;Thin liquids (carb controlled)  Dysphagia History: as above  Imaging Results:   CXR 4/19/24  CONCLUSION:    Poor inspiration low lung volumes.  Cardiomegaly.  Left lower lobe patchy atelectasis or infiltrate. No sizable effusion, but small effusion difficult to exclude on portable chest x-ray. No evidence for pneumothorax.  Consider upright PA   and lateral examination of the chest, when tolerated.      LOCATION:  [facility]                  Dictated by (CST): Bonifacio Herrera MD on 4/19/2024 at 5:44 PM       Finalized by (CST): Bonifacio Herrera MD on 4/19/2024 at 5:44 PM     MRI Brain 4/19/24  CONCLUSION:       Recent subacute infarct with a subcentimeter focus of restricted diffusion, but also showing increased T2/FLAIR signal indicating subacute nature of the infarct within the anterolateral inferior left thalamus.  Focal stenosis identified involving both   right and left proximal posterior cerebral arteries.         LOCATION:  [facility]         Dictated by (CST): Bonifacio Herrera MD on 4/19/2024 at 10:29 PM       Finalized by (CST): Bonifacio Herrera MD on 4/19/2024 at 10:37 PM     SUBJECTIVE       OBJECTIVE   ORAL MOTOR EXAMINATION  Dentition: Natural  Symmetry: Within Functional Limits  Strength: Within Functional Limits     Range of Motion: Reduced right facial (minimal deficit)  Rate of Motion: Within Functional Limits    Voice Quality: Clear  Respiratory Status: Unlabored  Consistencies Trialed: Thin liquids;Puree;Hard solid  Method of Presentation: Self presentation;Staff/Clinician assistance;Spoon;Cup;Straw;Single sips;Consecutive swallows  Patient Positioning: Upright;Midline    Oral Phase of Swallow: Within Functional Limits                      Pharyngeal Phase of Swallow: Within Functional Limits           (Please note:  Silent aspiration cannot be evaluated clinically. Videofluoroscopic Swallow Study is required to rule-out silent aspiration.)    Esophageal Phase of Swallow: No complaints consistent with possible esophageal involvement  Comments: d/w RN              GOALS  Goal #1 The patient will tolerate regular consistency and thin liquids without overt signs or symptoms of aspiration with 95 % accuracy over 1-2 session(s).  In Progress   Goal #2 The patient/family/caregiver will demonstrate understanding and implementation of aspiration precautions and swallow strategies independently over 1-2 session(s).    In Progress   Goal #3 Communication evaluation.  In Progress   Goal #4     Goal #5     Goal #6     Goal #7     Goal #8       FOLLOW UP  Treatment Plan/Recommendations: Communication evaluation  Number of Visits to Meet Established Goals: 1  Follow Up Needed (Documentation Required): Yes  SLP Follow-up Date: 04/26/24    Thank you for your referral.   If you have any questions, please contact GRACIELA Ritter

## 2024-04-25 NOTE — PLAN OF CARE
Assumed care at 1930  A&O x4, RA, NSR  No pain  BP within parameters  Refused heparin shot, educated on importance.  US of Kidney (Pending)  Patient updated on POC, all questions answered.          Problem: Diabetes/Glucose Control  Goal: Glucose maintained within prescribed range  Description: INTERVENTIONS:  - Monitor Blood Glucose as ordered  - Assess for signs and symptoms of hyperglycemia and hypoglycemia  - Administer ordered medications to maintain glucose within target range  - Assess barriers to adequate nutritional intake and initiate nutrition consult as needed  - Instruct patient on self management of diabetes  Outcome: Progressing    Problem: NEUROLOGICAL - ADULT  Goal: Achieves stable or improved neurological status  Description: INTERVENTIONS  - Assess for and report changes in neurological status  - Initiate measures to prevent increased intracranial pressure  - Maintain blood pressure and fluid volume within ordered parameters to optimize cerebral perfusion and minimize risk of hemorrhage  - Monitor temperature, glucose, and sodium. Initiate appropriate interventions as ordered  Outcome: Progressing  Goal: Absence of seizures  Description: INTERVENTIONS  - Monitor for seizure activity  - Administer anti-seizure medications as ordered  - Monitor neurological status  Outcome: Progressing  Goal: Remains free of injury related to seizure activity  Description: INTERVENTIONS:  - Maintain airway, patient safety  and administer oxygen as ordered  - Monitor patient for seizure activity, document and report duration and description of seizure to MD/LIP  - If seizure occurs, turn patient to side and suction secretions as needed  - Reorient patient post seizure  - Seizure pads on all 4 side rails  - Instruct patient/family to notify RN of any seizure activity  - Instruct patient/family to call for assistance with activity based on assessment  Outcome: Progressing  Goal: Achieves maximal functionality and self  care  Description: INTERVENTIONS  - Monitor swallowing and airway patency with patient fatigue and changes in neurological status  - Encourage and assist patient to increase activity and self care with guidance from PT/OT  - Encourage visually impaired, hearing impaired and aphasic patients to use assistive/communication devices  Outcome: Progressing

## 2024-04-26 ENCOUNTER — HOSPITAL ENCOUNTER (OUTPATIENT)
Dept: CV DIAGNOSTICS | Facility: HOSPITAL | Age: 74
Discharge: HOME | End: 2024-04-26
Attending: Other
Payer: MEDICARE

## 2024-04-26 VITALS
OXYGEN SATURATION: 97 % | HEART RATE: 86 BPM | BODY MASS INDEX: 31 KG/M2 | DIASTOLIC BLOOD PRESSURE: 72 MMHG | RESPIRATION RATE: 22 BRPM | SYSTOLIC BLOOD PRESSURE: 149 MMHG | WEIGHT: 215 LBS | TEMPERATURE: 98 F

## 2024-04-26 DIAGNOSIS — I63.9 ACUTE CVA (CEREBROVASCULAR ACCIDENT) (HCC): ICD-10-CM

## 2024-04-26 LAB
ANION GAP SERPL CALC-SCNC: 7 MMOL/L (ref 0–18)
BASOPHILS # BLD AUTO: 0.01 X10(3) UL (ref 0–0.2)
BASOPHILS NFR BLD AUTO: 0.1 %
BUN BLD-MCNC: 53 MG/DL (ref 9–23)
CALCIUM BLD-MCNC: 9 MG/DL (ref 8.5–10.1)
CHLORIDE SERPL-SCNC: 108 MMOL/L (ref 98–112)
CO2 SERPL-SCNC: 23 MMOL/L (ref 21–32)
CREAT BLD-MCNC: 1.99 MG/DL
EGFRCR SERPLBLD CKD-EPI 2021: 34 ML/MIN/1.73M2 (ref 60–?)
EOSINOPHIL # BLD AUTO: 0.19 X10(3) UL (ref 0–0.7)
EOSINOPHIL NFR BLD AUTO: 2 %
ERYTHROCYTE [DISTWIDTH] IN BLOOD BY AUTOMATED COUNT: 13.3 %
GLUCOSE BLD-MCNC: 105 MG/DL (ref 70–99)
GLUCOSE BLD-MCNC: 148 MG/DL (ref 70–99)
GLUCOSE BLD-MCNC: 182 MG/DL (ref 70–99)
HCT VFR BLD AUTO: 39.8 %
HGB BLD-MCNC: 13.2 G/DL
IMM GRANULOCYTES # BLD AUTO: 0.02 X10(3) UL (ref 0–1)
IMM GRANULOCYTES NFR BLD: 0.2 %
LYMPHOCYTES # BLD AUTO: 1.56 X10(3) UL (ref 1–4)
LYMPHOCYTES NFR BLD AUTO: 16.4 %
MCH RBC QN AUTO: 27 PG (ref 26–34)
MCHC RBC AUTO-ENTMCNC: 33.2 G/DL (ref 31–37)
MCV RBC AUTO: 81.6 FL
MONOCYTES # BLD AUTO: 0.78 X10(3) UL (ref 0.1–1)
MONOCYTES NFR BLD AUTO: 8.2 %
NEUTROPHILS # BLD AUTO: 6.94 X10 (3) UL (ref 1.5–7.7)
NEUTROPHILS # BLD AUTO: 6.94 X10(3) UL (ref 1.5–7.7)
NEUTROPHILS NFR BLD AUTO: 73.1 %
OSMOLALITY SERPL CALC.SUM OF ELEC: 305 MOSM/KG (ref 275–295)
PLATELET # BLD AUTO: 232 10(3)UL (ref 150–450)
POTASSIUM SERPL-SCNC: 4.3 MMOL/L (ref 3.5–5.1)
RBC # BLD AUTO: 4.88 X10(6)UL
SODIUM SERPL-SCNC: 138 MMOL/L (ref 136–145)
WBC # BLD AUTO: 9.5 X10(3) UL (ref 4–11)

## 2024-04-26 PROCEDURE — 93229 REMOTE 30 DAY ECG TECH SUPP: CPT | Performed by: OTHER

## 2024-04-26 NOTE — CM/SW NOTE
DC planning.     MJ requesting updated CBC/BMP prior to DC. Pt will need heart monitor placed prior to 2pm, RN to call for placement. Updates to follow.    Pt to dc at 5pm via [facility] to [facility]. PCS completed. Pt to dc to room 1172, RN to call [facility] at [phone] for report.        NADYA Harry

## 2024-04-26 NOTE — PROGRESS NOTES
[facility] - Nephrology  Inpatient Follow-up    Doug Johnson Patient Status:  Inpatient    1950 MRN [id number]   [facility] 7NE-A Attending Herman Samson, DO   Hosp Day # 6 PCP Jean Lunsford MD       SUBJECTIVE:     Patient seen and examined at bedside. No acute complaints today.     MEDICATIONS:     Current Facility-Administered Medications   Medication Dose Route Frequency    insulin aspart (NovoLOG) 100 Units/mL FlexPen 1-10 Units  1-10 Units Subcutaneous Q8H    hydrALAzine (Apresoline) 20 mg/mL injection 10 mg  10 mg Intravenous Q6H PRN    losartan (Cozaar) tab 25 mg  25 mg Oral Daily    docusate sodium (Colace) cap 100 mg  100 mg Oral BID    polyethylene glycol (PEG 3350) (Miralax) 17 g oral packet 17 g  17 g Oral Daily PRN    magnesium hydroxide (Milk of Magnesia) 400 MG/5ML oral suspension 30 mL  30 mL Oral Daily PRN    bisacodyl (Dulcolax) 10 MG rectal suppository 10 mg  10 mg Rectal Daily PRN    fleet enema (Fleet) 7-19 GM/118ML rectal enema 133 mL  1 enema Rectal Once PRN    insulin degludec 100 units/mL flextouch 35 Units  35 Units Subcutaneous Daily    aspirin chewable tab 81 mg  81 mg Oral Daily    acetaminophen (Tylenol) tab 650 mg  650 mg Oral Q4H PRN    Or    acetaminophen (Tylenol) rectal suppository 650 mg  650 mg Rectal Q4H PRN    labetalol (Trandate) 5 mg/mL injection 10 mg  10 mg Intravenous Q10 Min PRN    ondansetron (Zofran) 4 MG/2ML injection 4 mg  4 mg Intravenous Q6H PRN    metoclopramide (Reglan) 5 mg/mL injection 10 mg  10 mg Intravenous Q8H PRN    heparin (Porcine) 5000 UNIT/ML injection 5,000 Units  5,000 Units Subcutaneous Q8H JUAN JOSE    glucose (Dex4) 15 GM/59ML oral liquid 15 g  15 g Oral Q15 Min PRN    Or    glucose (Glutose) 40% oral gel 15 g  15 g Oral Q15 Min PRN    Or    glucose-vitamin C (Dex-4) chewable tab 4 tablet  4 tablet Oral Q15 Min PRN    Or    dextrose 50% injection 50 mL  50 mL Intravenous Q15 Min PRN    Or    glucose (Dex4) 15  GM/59ML oral liquid 30 g  30 g Oral Q15 Min PRN    Or    glucose (Glutose) 40% oral gel 30 g  30 g Oral Q15 Min PRN    Or    glucose-vitamin C (Dex-4) chewable tab 8 tablet  8 tablet Oral Q15 Min PRN    acetaminophen (Tylenol Extra Strength) tab 500 mg  500 mg Oral Q4H PRN    atorvastatin (Lipitor) tab 80 mg  80 mg Oral Nightly    clopidogrel (Plavix) tab 75 mg  75 mg Oral Daily       PHYSICAL EXAM:     Vital Signs: /62 (BP Location: Right arm)   Pulse 67   Temp 97.6 °F (36.4 °C) (Oral)   Resp 25   Wt 215 lb (97.5 kg)   SpO2 93%   BMI 30.85 kg/m²   Temp (24hrs), Av.9 °F (36.6 °C), Min:97.6 °F (36.4 °C), Max:98.6 °F (37 °C)       Intake/Output Summary (Last 24 hours) at 2024 1406  Last data filed at 2024 0800  Gross per 24 hour   Intake --   Output 325 ml   Net -325 ml     Wt Readings from Last 3 Encounters:   24 215 lb (97.5 kg)   10/07/22 210 lb (95.3 kg)   19 219 lb 4.8 oz (99.5 kg)       General: no acute distress  HEENT: normocephalic, atraumatic  CV: RRR  Respiratory: no distress  Abdomen: soft, non-tender  Extremities: no edema bilaterally  Skin: no rashes on visible skin  Neuro: alert and oriented x3     LABORATORY DATA:     Lab Results   Component Value Date     (H) 2024    BUN 53 (H) 2024    CREATSERUM 1.99 (H) 2024    ANIONGAP 7 2024    GFR 72 2017    GFRNAA 72 2013    GFRAA 87 2013    CA 9.0 2024    OSMOCALC 305 (H) 2024    ALKPHO 99 2024    AST 25 2024    ALT 23 2024    BILT 0.4 2024    TP 7.3 2024    ALB 3.6 2024    GLOBULIN 3.7 2024    AGRATIO 1.8 2014     2024    K 4.3 2024     2024    CO2 23.0 2024     Lab Results   Component Value Date    WBC 9.5 2024    RBC 4.88 2024    HGB 13.2 2024    HCT 39.8 2024    .0 2024    MPV 10.0 2013    MCV 81.6 2024    MCH 27.0 2024     MCHC 33.2 04/26/2024    RDW 13.3 04/26/2024    NEPRELIM 6.94 04/26/2024    NEPERCENT 73.1 04/26/2024    LYPERCENT 16.4 04/26/2024    MOPERCENT 8.2 04/26/2024    EOPERCENT 2.0 04/26/2024    BAPERCENT 0.1 04/26/2024    NE 6.94 04/26/2024    LYMABS 1.56 04/26/2024    MOABSO 0.78 04/26/2024    EOABSO 0.19 04/26/2024    BAABSO 0.01 04/26/2024     Lab Results   Component Value Date    MALBP 26.40 04/24/2024    CREUR 140.00 04/24/2024    CREUR 140.00 04/24/2024     Lab Results   Component Value Date    COLORUR Light-Yellow 04/24/2024    CLARITY Clear 04/24/2024    SPECGRAVITY 1.021 04/24/2024    GLUUR 70 (A) 04/24/2024    BILUR Negative 04/24/2024    KETUR Negative 04/24/2024    BLOODURINE Negative 04/24/2024    PHURINE 5.0 04/24/2024    PROUR 50 (A) 04/24/2024    UROBILINOGEN Normal 04/24/2024    NITRITE Negative 04/24/2024    LEUUR Negative 04/24/2024    NMIC Microscopic not indicated 03/09/2017    WBCUR 1-5 04/24/2024    RBCUR 0-2 04/24/2024    EPIUR None Seen 04/24/2024    BACUR None Seen 04/24/2024       IMAGING:     Reviewed    ASSESSMENT:      # CKD stage 3b  -Baseline creatinine 1.8-2.0  -Urinalysis with glucosuria, proteinuria, hematuria  -Ultrasound unremarkable     # Hypertension     PLAN:      -Continue losartan 25mg daily in setting of hypertension, diabetes, CKD  -Monitor kidney function closely - will need outpatient labs in 7-10 days after initiation of losartan  -Counseled on adequate fluid intake at least 48oz daily  -Follow-up outpatient with Nephrology - will place through [facility]  -Avoid nephrotoxins and renally dose medications for creatinine clearance  -Monitor intake and output daily  -Daily weights           Okay for discharge from nephrology perspective. Will follow up outpatient.    Thank you for allowing us to participate in the care of this patient.       DO Osman Anaya [facility] - Nephrology

## 2024-04-26 NOTE — PLAN OF CARE
NURSING DISCHARGE NOTE    Discharged Rehab facility via Wheel[facility].  Accompanied by Support staff  Belongings Taken by patient/family.    Pt AxO4  RA  NSR  No pain at this time  Max assist with SS    Neurocheck  -see note  NIH daily  Monitor/manage BG level  Monitor/manage BP level  Transfer to rehab/facility?  Pt informed of POC, all questions answered  -pt family updated    Pt given and informed of discharge instructions, start and continue medications, f/u dates, and further education handouts related to admission. Pt and family verbally understand discharge instructions, all questions answered.    Called [facility] at [phone]  -report called to [place], informed reason for admission and discharge POC, all questions answered    Problem: Diabetes/Glucose Control  Goal: Glucose maintained within prescribed range  Description: INTERVENTIONS:  - Monitor Blood Glucose as ordered  - Assess for signs and symptoms of hyperglycemia and hypoglycemia  - Administer ordered medications to maintain glucose within target range  - Assess barriers to adequate nutritional intake and initiate nutrition consult as needed  - Instruct patient on self management of diabetes  4/26/2024 1631 by Umer Headley RN  Outcome: Adequate for Discharge  4/26/2024 1139 by Umer Headley RN  Outcome: Progressing  4/26/2024 1136 by Umer Headley RN  Outcome: Progressing     Problem: Patient/Family Goals  Goal: Patient/Family Long Term Goal  Description: Patient's Long Term Goal: discharge in stable condition    Interventions:  - ambulate as tolerated 3x a day  - up to chair for meals  - stabilize blood pressure  - See additional Care Plan goals for specific interventions  4/26/2024 1631 by Umer Headley RN  Outcome: Adequate for Discharge  4/26/2024 1139 by Umer Headley RN  Outcome: Progressing  4/26/2024 1136 by Umer Headley, RN  Outcome: Progressing  Goal: Patient/Family Short Term Goal  Description: Patient's Short Term  Goal: pain control    Interventions:   - PRNs as needed  - decrease stress stimuli  - See additional Care Plan goals for specific interventions  4/26/2024 1631 by Umer Headley RN  Outcome: Adequate for Discharge  4/26/2024 1139 by Umer Headley RN  Outcome: Progressing  4/26/2024 1136 by Umer Headley RN  Outcome: Progressing     Problem: NEUROLOGICAL - ADULT  Goal: Achieves stable or improved neurological status  Description: INTERVENTIONS  - Assess for and report changes in neurological status  - Initiate measures to prevent increased intracranial pressure  - Maintain blood pressure and fluid volume within ordered parameters to optimize cerebral perfusion and minimize risk of hemorrhage  - Monitor temperature, glucose, and sodium. Initiate appropriate interventions as ordered  4/26/2024 1631 by Umer Headley RN  Outcome: Adequate for Discharge  4/26/2024 1139 by Umer Headley RN  Outcome: Progressing  4/26/2024 1136 by Umer Headley RN  Outcome: Progressing  Goal: Absence of seizures  Description: INTERVENTIONS  - Monitor for seizure activity  - Administer anti-seizure medications as ordered  - Monitor neurological status  4/26/2024 1631 by Umer Headley RN  Outcome: Adequate for Discharge  4/26/2024 1139 by Umer Headley RN  Outcome: Progressing  4/26/2024 1136 by Umer Headley RN  Outcome: Progressing  Goal: Remains free of injury related to seizure activity  Description: INTERVENTIONS:  - Maintain airway, patient safety  and administer oxygen as ordered  - Monitor patient for seizure activity, document and report duration and description of seizure to MD/LIP  - If seizure occurs, turn patient to side and suction secretions as needed  - Reorient patient post seizure  - Seizure pads on all 4 side rails  - Instruct patient/family to notify RN of any seizure activity  - Instruct patient/family to call for assistance with activity based on assessment  4/26/2024 1631 by Umer Headley RN  Outcome:  Adequate for Discharge  4/26/2024 1139 by Umer Headley RN  Outcome: Progressing  4/26/2024 1136 by Umer Headley RN  Outcome: Progressing  Goal: Achieves maximal functionality and self care  Description: INTERVENTIONS  - Monitor swallowing and airway patency with patient fatigue and changes in neurological status  - Encourage and assist patient to increase activity and self care with guidance from PT/OT  - Encourage visually impaired, hearing impaired and aphasic patients to use assistive/communication devices  4/26/2024 1631 by Umer Headley RN  Outcome: Adequate for Discharge  4/26/2024 1139 by Umer Headley RN  Outcome: Progressing  4/26/2024 1136 by Umer Headley RN  Outcome: Progressing

## 2024-04-26 NOTE — PLAN OF CARE
Assumed care at 1900.   Alert and oriented x4; neuro completed with no new deficits noted.   NSR; RA; denies pain.   Pt able to use urinal.  Pending discharge to .   Fall precautions in place and call light within reach.   Report given and pt handed off to CHELY Sargent at 2300.     Problem: Diabetes/Glucose Control  Goal: Glucose maintained within prescribed range  Description: INTERVENTIONS:  - Monitor Blood Glucose as ordered  - Assess for signs and symptoms of hyperglycemia and hypoglycemia  - Administer ordered medications to maintain glucose within target range  - Assess barriers to adequate nutritional intake and initiate nutrition consult as needed  - Instruct patient on self management of diabetes  Outcome: Progressing     Problem: NEUROLOGICAL - ADULT  Goal: Achieves stable or improved neurological status  Description: INTERVENTIONS  - Assess for and report changes in neurological status  - Initiate measures to prevent increased intracranial pressure  - Maintain blood pressure and fluid volume within ordered parameters to optimize cerebral perfusion and minimize risk of hemorrhage  - Monitor temperature, glucose, and sodium. Initiate appropriate interventions as ordered  Outcome: Progressing  Goal: Absence of seizures  Description: INTERVENTIONS  - Monitor for seizure activity  - Administer anti-seizure medications as ordered  - Monitor neurological status  Outcome: Progressing  Goal: Remains free of injury related to seizure activity  Description: INTERVENTIONS:  - Maintain airway, patient safety  and administer oxygen as ordered  - Monitor patient for seizure activity, document and report duration and description of seizure to MD/LIP  - If seizure occurs, turn patient to side and suction secretions as needed  - Reorient patient post seizure  - Seizure pads on all 4 side rails  - Instruct patient/family to notify RN of any seizure activity  - Instruct patient/family to call for assistance with activity based  on assessment  Outcome: Progressing  Goal: Achieves maximal functionality and self care  Description: INTERVENTIONS  - Monitor swallowing and airway patency with patient fatigue and changes in neurological status  - Encourage and assist patient to increase activity and self care with guidance from PT/OT  - Encourage visually impaired, hearing impaired and aphasic patients to use assistive/communication devices  Outcome: Progressing

## 2024-04-26 NOTE — PROGRESS NOTES
AOx4, right sided weakness. Neuro's q shift. Room air, clear lungs. NSR. Voids per urinal. QID accuchecks.

## 2024-04-29 ENCOUNTER — PATIENT OUTREACH (OUTPATIENT)
Dept: CASE MANAGEMENT | Age: 74
End: 2024-04-29

## 2024-04-29 NOTE — PAYOR COMM NOTE
--------------  DISCHARGE REVIEW    Payor: [employer] PPO  Subscriber #:  [id number]  Authorization Number: [id number]    Admit date: 4/20/24  Admit time:   1:35 PM  Discharge Date: 4/26/2024  5:46 PM     Admitting Physician: Gris Rose MD  Attending Physician:  No att. providers found  Primary Care Physician: Jean Lunsford MD          Discharge Summary Notes        Discharge Summary signed by Herman Samson DO at 4/26/2024 11:19 AM       Author: Herman Samson DO Specialty: Internal Medicine Author Type: Physician    Filed: 4/26/2024 11:19 AM Date of Service: 4/26/2024  1:10 AM Status: Signed    : Herman Samson DO (Physician)                                                          [facility] Internal Medicine Hospitalist Discharge Summary     Patient ID:  Doug Johnson  74 year old  2/5/1950    Admit date: 4/19/2024    Discharge date and time: 4/26/2024    Attending Physician: Herman Samson DO     Primary Care Physician: Jean Lunsford MD     Discharge Diagnoses:  acute/subactue CVA anterolateral inferior L thalamus  ocal stenosis of R and L posterior cerebral arteries  R sided weakness due to CVA  Essential HTN  DM II  CKD stage 3    Please note that only IHP DMG and EMG patients enrolled in the Medicare ACO, [facility] ACO and [facility] HMOs will be handled by the \[facility]\"" Care Management team.  For all other patients, please follow usual protocol for discharge care transition.    Discharge Condition: stable    Disposition:  [facility] Acute rehab     Important Follow up:  - PCP within 1 week of rehab discharge   - Consults: Neuro, renal, PMR    Follow Up Items:  Repeat BMP per renal      Hospital Course:      74 year old male with PMH sig for DM2, presents to ER for eval R arm and L weakness and numbness.      # acute/subactue CVA anterolateral inferior L thalamus  # focal stenosis of R and L posterior cerebral arteries  # R sided weakness due to CVA  # Essential HTN  -  CVA order set, permissive HTN 48 h, if BP remain elevated tomorrow consider starting anti- HTN such as ace  - asa, plavix, statin  - ECHO nml EF, no clot  - seen by speech and cleared for po  - PT/OT rec acute rehab  - holter on dc (neuro ordered)   - cont normotension per neuro.  D/w renal, start losartan 25 mg po daily today.  Monitor BMP as outpt     # DM2, uncontrolled, insulin dependent  - hga1c 12.1.  on humilin u500 70 u qam and 30-40 u q pm  - tresiba 35 u daily, carb ratio 1:7 and medium dose ssi here.    - adjust as needed    # elevated cr, suspected CKD stage 3  - suspect may be CKD, last cr 2017 1.06.    - renal c/s, d/w Dr. Morton   - monitor as outpt     Stable for discharge to  acute rehab.     Consults: IP CONSULT TO NEUROLOGY  IP CONSULT TO SOCIAL WORK  IP CONSULT TO PHARMACY  IP CONSULT TO PHYSICAL MEDICINE REHAB  IP CONSULT TO SOCIAL WORK    Operative Procedures:  None      Patient instructions:      I as the attending physician reconciled the current and discharge medications on day of discharge.        Medication List        START taking these medications      aspirin 81 MG Chew  Chew 1 tablet (81 mg total) by mouth daily.     atorvastatin 80 MG Tabs  Commonly known as: Lipitor  Take 1 tablet (80 mg total) by mouth nightly.     clopidogrel 75 MG Tabs  Commonly known as: Plavix  Take 1 tablet (75 mg total) by mouth daily.     docusate sodium 100 MG Caps  Commonly known as: COLACE     losartan 25 MG Tabs  Commonly known as: Cozaar     Polyethylene Glycol 3350 17 g Pack  Commonly known as: MIRALAX            CONTINUE taking these medications      cholecalciferol 125 MCG (5000 UT) Tabs     HumuLIN R U-500 KwikPen 500 UNIT/ML Sopn  Generic drug: insulin regular human (conc)     Magnesium Gluconate 500 (27 Mg) MG Tabs     Vitamin K (Phytonadione) 100 MCG Tabs               Where to Get Your Medications        These medications were sent to [id number] - [place], [place] - [address] AT [facility] [address], [phone], [phone]  [address], [facility] [address]      Phone: [phone]   aspirin 81 MG Chew  atorvastatin 80 MG Tabs  clopidogrel 75 MG Tabs            Activity:  Up with assist  Diet: cardiac diet and diabetic diet  Wound Care: as directed  Code Status: No Order      Exam on day of discharge:     Vitals:    04/22/24 1230   BP: (!) 179/82   Pulse: 88   Resp: 17   Temp:        General: No acute distress.   HEENT:  EOMI, PERRLA, OP clear  Respiratory: Clear to auscultation bilaterally. No wheezes. No rhonchi.  Cardiovascular: S1, S2. Regular rate and rhythm. No murmurs.  Abdomen: Soft, nontender, nondistended.  Positive bowel sounds. No rebound or guarding.  Extremities: No edema.  Neuro:  +RUE and RLE weakness      Total time coordinating care for discharge: Greater than 30 minutes    Herman Samson DO  [facility] Hospitalist       Electronically signed by Herman Samson DO on 4/26/2024 11:19 AM         REVIEWER COMMENTS

## 2024-04-29 NOTE — PROGRESS NOTES
Hospital follow up.    Last A1C Value: 12.1% Date: [4/19/2024]  Patient discharged to [facility].    Visit Type: Stroke follow-up  Date of TIA/Stroke: 4/19/2024  Type of Stroke: Ischemic  Patient to follow-up: 4 weeks    No answer, left a voicemail with callback information.

## 2024-04-30 NOTE — PROGRESS NOTES
Hospital follow up.    Last A1C Value: 12.1% Date: [4/19/2024]  Patient discharged to [facility].    Visit Type: Stroke follow-up  Date of TIA/Stroke: 4/19/2024  Type of Stroke: Ischemic  Patient to follow-up: 4 weeks    No answer, left a voicemail with callback information.    Closing encounter.

## 2024-05-03 ENCOUNTER — TELEPHONE (OUTPATIENT)
Dept: MEDSURG UNIT | Facility: HOSPITAL | Age: 74
End: 2024-05-03

## 2024-05-03 NOTE — PROGRESS NOTES
Attempted to reach patient to complete 7-day stroke follow-up call. No answer, left voicemail with instructions to call the non-emergent stroke line at [phone] with any questions or concerns.

## 2024-05-19 DIAGNOSIS — E11.8 TYPE 2 DIABETES WITH COMPLICATION  (CMD): ICD-10-CM

## 2024-05-19 DIAGNOSIS — E10.9 TYPE 1 DIABETES MELLITUS WITHOUT COMPLICATION  (CMD): ICD-10-CM

## 2024-05-21 RX ORDER — INSULIN LISPRO 100 [IU]/ML
INJECTION, SOLUTION INTRAVENOUS; SUBCUTANEOUS
Qty: 45 ML | Refills: 2 | OUTPATIENT
Start: 2024-05-21

## 2024-10-07 ENCOUNTER — TELEPHONE (OUTPATIENT)
Dept: ENDOCRINOLOGY | Age: 74
End: 2024-10-07

## 2024-10-24 ENCOUNTER — APPOINTMENT (OUTPATIENT)
Dept: CT IMAGING | Facility: HOSPITAL | Age: 74
End: 2024-10-24
Attending: EMERGENCY MEDICINE
Payer: MEDICARE

## 2024-10-24 ENCOUNTER — HOSPITAL ENCOUNTER (EMERGENCY)
Facility: HOSPITAL | Age: 74
Discharge: HOME | End: 2024-10-24
Attending: EMERGENCY MEDICINE
Payer: MEDICARE

## 2024-10-24 VITALS
SYSTOLIC BLOOD PRESSURE: 105 MMHG | RESPIRATION RATE: 20 BRPM | TEMPERATURE: 99 F | HEIGHT: 70 IN | OXYGEN SATURATION: 100 % | HEART RATE: 79 BPM | WEIGHT: 190 LBS | DIASTOLIC BLOOD PRESSURE: 68 MMHG | BODY MASS INDEX: 27.2 KG/M2

## 2024-10-24 DIAGNOSIS — R33.9 URINARY RETENTION: ICD-10-CM

## 2024-10-24 DIAGNOSIS — K59.00 CONSTIPATION, UNSPECIFIED CONSTIPATION TYPE: Primary | ICD-10-CM

## 2024-10-24 LAB
ALBUMIN SERPL-MCNC: 4.7 G/DL (ref 3.2–4.8)
ALBUMIN/GLOB SERPL: 1.8 {RATIO} (ref 1–2)
ALP LIVER SERPL-CCNC: 95 U/L
ALT SERPL-CCNC: 30 U/L
ANION GAP SERPL CALC-SCNC: 7 MMOL/L (ref 0–18)
AST SERPL-CCNC: 28 U/L (ref ?–34)
BASOPHILS # BLD AUTO: 0.02 X10(3) UL (ref 0–0.2)
BASOPHILS NFR BLD AUTO: 0.1 %
BILIRUB SERPL-MCNC: 0.4 MG/DL (ref 0.2–1.1)
BILIRUB UR QL STRIP.AUTO: NEGATIVE
BUN BLD-MCNC: 42 MG/DL (ref 9–23)
CALCIUM BLD-MCNC: 10 MG/DL (ref 8.7–10.4)
CHLORIDE SERPL-SCNC: 105 MMOL/L (ref 98–112)
CLARITY UR REFRACT.AUTO: CLEAR
CO2 SERPL-SCNC: 25 MMOL/L (ref 21–32)
CREAT BLD-MCNC: 1.79 MG/DL
EGFRCR SERPLBLD CKD-EPI 2021: 39 ML/MIN/1.73M2 (ref 60–?)
EOSINOPHIL # BLD AUTO: 0.1 X10(3) UL (ref 0–0.7)
EOSINOPHIL NFR BLD AUTO: 0.7 %
ERYTHROCYTE [DISTWIDTH] IN BLOOD BY AUTOMATED COUNT: 13.2 %
GLOBULIN PLAS-MCNC: 2.6 G/DL (ref 2–3.5)
GLUCOSE BLD-MCNC: 204 MG/DL (ref 70–99)
GLUCOSE UR STRIP.AUTO-MCNC: NORMAL MG/DL
HCT VFR BLD AUTO: 37.9 %
HGB BLD-MCNC: 12.7 G/DL
IMM GRANULOCYTES # BLD AUTO: 0.04 X10(3) UL (ref 0–1)
IMM GRANULOCYTES NFR BLD: 0.3 %
KETONES UR STRIP.AUTO-MCNC: NEGATIVE MG/DL
LEUKOCYTE ESTERASE UR QL STRIP.AUTO: NEGATIVE
LIPASE SERPL-CCNC: 44 U/L (ref 12–53)
LYMPHOCYTES # BLD AUTO: 2.07 X10(3) UL (ref 1–4)
LYMPHOCYTES NFR BLD AUTO: 14.8 %
MCH RBC QN AUTO: 27.8 PG (ref 26–34)
MCHC RBC AUTO-ENTMCNC: 33.5 G/DL (ref 31–37)
MCV RBC AUTO: 82.9 FL
MONOCYTES # BLD AUTO: 0.7 X10(3) UL (ref 0.1–1)
MONOCYTES NFR BLD AUTO: 5 %
NEUTROPHILS # BLD AUTO: 11.04 X10 (3) UL (ref 1.5–7.7)
NEUTROPHILS # BLD AUTO: 11.04 X10(3) UL (ref 1.5–7.7)
NEUTROPHILS NFR BLD AUTO: 79.1 %
NITRITE UR QL STRIP.AUTO: NEGATIVE
OSMOLALITY SERPL CALC.SUM OF ELEC: 300 MOSM/KG (ref 275–295)
PH UR STRIP.AUTO: 5 [PH] (ref 5–8)
PLATELET # BLD AUTO: 357 10(3)UL (ref 150–450)
POTASSIUM SERPL-SCNC: 4.6 MMOL/L (ref 3.5–5.1)
PROT SERPL-MCNC: 7.3 G/DL (ref 5.7–8.2)
RBC # BLD AUTO: 4.57 X10(6)UL
RBC UR QL AUTO: NEGATIVE
SODIUM SERPL-SCNC: 137 MMOL/L (ref 136–145)
SP GR UR STRIP.AUTO: 1.01 (ref 1–1.03)
UROBILINOGEN UR STRIP.AUTO-MCNC: NORMAL MG/DL
WBC # BLD AUTO: 14 X10(3) UL (ref 4–11)

## 2024-10-24 PROCEDURE — 83690 ASSAY OF LIPASE: CPT | Performed by: EMERGENCY MEDICINE

## 2024-10-24 PROCEDURE — 96375 TX/PRO/DX INJ NEW DRUG ADDON: CPT

## 2024-10-24 PROCEDURE — 99285 EMERGENCY DEPT VISIT HI MDM: CPT

## 2024-10-24 PROCEDURE — 96374 THER/PROPH/DIAG INJ IV PUSH: CPT

## 2024-10-24 PROCEDURE — 81003 URINALYSIS AUTO W/O SCOPE: CPT | Performed by: EMERGENCY MEDICINE

## 2024-10-24 PROCEDURE — 80053 COMPREHEN METABOLIC PANEL: CPT | Performed by: EMERGENCY MEDICINE

## 2024-10-24 PROCEDURE — 51798 US URINE CAPACITY MEASURE: CPT

## 2024-10-24 PROCEDURE — 96376 TX/PRO/DX INJ SAME DRUG ADON: CPT

## 2024-10-24 PROCEDURE — 74177 CT ABD & PELVIS W/CONTRAST: CPT | Performed by: EMERGENCY MEDICINE

## 2024-10-24 PROCEDURE — 85025 COMPLETE CBC W/AUTO DIFF WBC: CPT | Performed by: EMERGENCY MEDICINE

## 2024-10-24 RX ORDER — ONDANSETRON 2 MG/ML
4 INJECTION INTRAMUSCULAR; INTRAVENOUS ONCE
Status: COMPLETED | OUTPATIENT
Start: 2024-10-24 | End: 2024-10-24

## 2024-10-24 RX ORDER — HYDROMORPHONE HYDROCHLORIDE 1 MG/ML
0.5 INJECTION, SOLUTION INTRAMUSCULAR; INTRAVENOUS; SUBCUTANEOUS EVERY 30 MIN PRN
Status: DISCONTINUED | OUTPATIENT
Start: 2024-10-24 | End: 2024-10-24

## 2024-10-24 NOTE — DISCHARGE INSTRUCTIONS
RECHECK BLADDER VOLUME WITH UROLOGIST THIS WEEK.    TREATING CONSTIPATION SHOULD HELP IMPROVE URINE RETENTION OVER TIME

## 2024-10-24 NOTE — ED INITIAL ASSESSMENT (HPI)
ABD pain and distension that started yesterday morning and intensified about 5 hours PTA. Hx CVA last Spring. Recent bouts of constipation/diarrhea.

## 2024-10-24 NOTE — ED PROVIDER NOTES
Patient Seen in: [facility] Emergency Department      History     Chief Complaint   Patient presents with    Abdomen/Flank Pain     Stated Complaint: ABD pain    Subjective:   HPI      74-year-old male presents for evaluation of abdominal pain.  Patient has had worsening mid abdominal pain over the past 5 hours.  Pain has been associated with difficulty having bowel movement for few days.  Patient took a SEVERAL laxatives earlier today which made pain worse.  Denies any nausea or vomiting.  Denies any urinary symptoms.  History of CVA earlier this year with residual right sided weakness    Objective:     Past Medical History:    Arrhythmia    CVA (cerebral vascular accident) (HCC)    Diabetes mellitus (HCC)    High blood pressure    Type II or unspecified type diabetes mellitus without mention of complication, not stated as uncontrolled              Past Surgical History:   Procedure Laterality Date    Colonoscopy      2005    Tonsillectomy                  Social History     Socioeconomic History    Marital status:    Tobacco Use    Smoking status: Never    Smokeless tobacco: Never   Vaping Use    Vaping status: Never Used   Substance and Sexual Activity    Alcohol use: No    Drug use: No     Social Drivers of Health     Food Insecurity: Low Risk  (5/17/2024)    Received from [facility]    Food Insecurity     Have there been times that your food ran out, and you didn't have money to get more?: No     Are there times that you worry that this might happen?: No   Transportation Needs: Low Risk  (5/17/2024)    Received from [facility]    Transportation Needs     Has lack of transportation kept you from medical appointments, meetings, work, or from getting things needed for daily living: No    Received from [facility]    Social Connections   Housing Stability: Low Risk  (5/17/2024)    Received from [facility]    Housing  Stability     Are you worried that your electric, gas, oil, or water might be shut off?: No     Are you concerned about having a safe and reliable place to live?: No                  Physical Exam     ED Triage Vitals [10/24/24 0206]   BP (!) 177/84   Pulse 92   Resp 20   Temp 99 °F (37.2 °C)   Temp src Temporal   SpO2 99 %   O2 Device None (Room air)       Current Vitals:   Vital Signs  BP: 122/85  Pulse: 93  Resp: 15  Temp: 99 °F (37.2 °C)  Temp src: Temporal  MAP (mmHg): 99    Oxygen Therapy  SpO2: 95 %  O2 Device: None (Room air)        Physical Exam     General: Alert, oriented, no apparent distress  HEENT:  Pupils equal reactive.  Extraocular motions intact. MMM.  Neck: Supple  Lungs: Clear to auscultation bilaterally.  Heart: Regular rate and rhythm.  Abdomen: Soft, distended, LLQ TTP, no rebound  Skin: No rash.  No edema.      ED Course     Labs Reviewed   COMP METABOLIC PANEL (14) - Abnormal; Notable for the following components:       Result Value    Glucose 204 (*)     BUN 42 (*)     Creatinine 1.79 (*)     Calculated Osmolality 300 (*)     eGFR-Cr 39 (*)     All other components within normal limits   CBC WITH DIFFERENTIAL WITH PLATELET - Abnormal; Notable for the following components:    WBC 14.0 (*)     HGB 12.7 (*)     HCT 37.9 (*)     Neutrophil Absolute Prelim 11.04 (*)     Neutrophil Absolute 11.04 (*)     All other components within normal limits   URINALYSIS WITH CULTURE REFLEX - Abnormal; Notable for the following components:    Protein Urine Trace (*)     All other components within normal limits   LIPASE - Normal                   MDM      Differential diagnosis includes diverticulitis, urinary retention, UTI    Chemistry with some baseline renal insufficiency.  Normal electrolytes.  CBC with a mild leukocytosis 14.0    CT scan shows lots of gas in the colon consistent with recent medications given for constipation.  No inflammation and no evidence of obstruction    UA negative    Patient  passed quite a bit of gas and stool while he was observed in the ER and felt much better.    Patient had a postvoid residual of about 300 cc urine.  This is more than would be expected.  This was discussed with the patient and his sister who will follow-up with urology this week to check another PVR.  It is possible that urinary retention will improve simply with treating his constipation      Medical Decision Making  Amount and/or Complexity of Data Reviewed  Independent Historian: caregiver     Details: Sister at bedside        Disposition and Plan     Clinical Impression:  1. Constipation, unspecified constipation type    2. Urinary retention         Disposition:  Discharge  10/24/2024  5:48 am    Follow-up:  Demetrius Tripp MD  [address]  [address] [place] [id number] [phone]    Call in 1 day(s)            Medications Prescribed:  Current Discharge Medication List              Supplementary Documentation:

## (undated) NOTE — IP AVS SNAPSHOT
Patient Demographics     [place]  [address] [facility] [address] Phone  [phone] (Home)  [phone] (Mobile) *Preferred* E-mail Address  [email or web address]      Patient Contacts     Name Relation Home Work Mobile    Sister Jose Alejandro Johnson [phone]       Allergies as of 4/26/2024  Review status set to In Progress on 4/19/2024   No Known Allergies     Code Status Information     Code Status    Not on file        Patient Instructions       Please draw BMP in 7-10 days with results to Dr[facility].      Follow-up Information     Kate Smith DO. Schedule an appointment as soon as possible for a visit in 4 week(s).    Specialty: NEUROLOGY  Why: You need to schedule a follow-up appointment with ANY available neurologist from [facility] for within 4-6 weeks of your discharge date.     “[facility] [phone] to schedule Neuro/Stroke appt prior to discharge from [facility]”  Contact information:  [address]  [address]  [facility] [address] [phone]             Jean Lunsford MD. Schedule an appointment as soon as possible for a visit in 1 month(s).    Specialty: Family Medicine  Contact information:  [address]  [address]  [facility] [address] [phone]             Liza Morton DO Follow up in 1 month(s).    Specialty: NEPHROLOGY  Why: Follow-up  Contact information:  [address]  [address]  [place] [address]  [phone]                        Your Home Meds List      TAKE these medications       Instructions Authorizing Provider Morning Afternoon Evening As Needed   aspirin 81 MG Chew  Next dose due: Tomorrow 4/27      Chew 1 tablet (81 mg total) by mouth daily.   Phillipmarie Masters         atorvastatin 80 MG Tabs  Commonly known as: Lipitor  Next dose due: Tonight at bedtime       Take 1 tablet (80 mg total) by mouth nightly.   Phillip Masters         cholecalciferol 125 MCG  (5000 UT) Tabs  Next dose due: Tomorrow 4/27      Take 1 tablet (5,000 Units total) by mouth daily.          clopidogrel 75 MG Tabs  Commonly known as: Plavix  Next dose due: Tomorrow 4/27      Take 1 tablet (75 mg total) by mouth daily.   Phillip Cristinvlad         docusate sodium 100 MG Caps  Commonly known as: COLACE  Next dose due: Tonight at 10PM      Take 100 mg by mouth 2 (two) times daily.   Herman Samson         HumuLIN R U-500 KwikPen 500 UNIT/ML Sopn  Generic drug: insulin regular human (conc)  Next dose due: As directed      Inject 0.1 mL (50 Units total) into the skin 3 (three) times daily before meals. Pt takes 70U @1200  30-40u @2200          losartan 25 MG Tabs  Commonly known as: Cozaar  Next dose due: Tomorrow 4/27      Take 1 tablet (25 mg total) by mouth daily.   Herman Samson         Magnesium Gluconate 500 (27 Mg) MG Tabs  Next dose due: Tomorrow 4/27      Take 0.4 tablets (200 mg total) by mouth daily.          Polyethylene Glycol 3350 17 g Pack  Commonly known as: MIRALAX      Take 17 g by mouth daily as needed.   Herman Samson         Vitamin K (Phytonadione) 100 MCG Tabs  Next dose due: Tomorrow 4/27      Take by mouth.                Where to Get Your Medications      These medications were sent to [id number] [address] [facility] [address], [phone], [phone]  [facility] [address]    Phone: [phone]   aspirin 81 MG Chew  atorvastatin 80 MG Tabs  clopidogrel 75 MG Tabs           [id number] - MAR ACTION REPORT  (last 48 hrs)    ** SITE UNKNOWN **     Order ID Medication Name Action Time Action Reason Comments    [id number] aspirin chewable tab 81 mg 04/25/24 0959 Given      [id number] aspirin chewable tab 81 mg 04/26/24 0952 Given      [id number] atorvastatin (Lipitor) tab 80 mg 04/24/24 2204 Given      [id number] atorvastatin (Lipitor) tab 80 mg 04/25/24 2153 Given      [id number] clopidogrel (Plavix)  tab 75 mg 04/25/24 0959 Given      [id number] clopidogrel (Plavix) tab 75 mg 04/26/24 0952 Given      [id number] docusate sodium (Colace) cap 100 mg 04/24/24 2205 Given      [id number] docusate sodium (Colace) cap 100 mg 04/25/24 2153 Given      [id number] losartan (Cozaar) tab 25 mg 04/25/24 0959 Given      [id number] losartan (Cozaar) tab 25 mg 04/26/24 0952 Given            LEFT LOWER ABDOMEN     Order ID Medication Name Action Time Action Reason Comments    [id number] heparin (Porcine) 5000 UNIT/ML injection 5,000 Units 04/25/24 0959 Given      [id number] heparin (Porcine) 5000 UNIT/ML injection 5,000 Units 04/26/24 0952 Given            LEFT LOWER ARM     Order ID Medication Name Action Time Action Reason Comments    [id number] insulin degludec 100 units/mL flextouch 35 Units 04/26/24 0952 Given            RIGHT LOWER ABDOMEN     Order ID Medication Name Action Time Action Reason Comments    [id number] heparin (Porcine) 5000 UNIT/ML injection 5,000 Units 04/26/24 0011 Given            RIGHT UPPER ARM     Order ID Medication Name Action Time Action Reason Comments    [id number] insulin aspart (NovoLOG) 100 Units/mL FlexPen 1-10 Units 04/25/24 2153 Given      [id number] insulin degludec 100 units/mL flextouch 35 Units 04/25/24 0959 Given              Recent Vital Signs    Flowsheet Row Most Recent Value   /76 Filed at 04/26/2024 1200   Pulse 69 Filed at 04/26/2024 1200   Resp 20 Filed at 04/26/2024 1200   Temp 97.4 °F (36.3 °C) Filed at 04/26/2024 1200   SpO2 95 % Filed at 04/26/2024 1200      Patient's Most Recent Weight    Flowsheet Row Most Recent Value   Patient Weight 97.5 kg (215 lb)         Lab Results Last 24 Hours      Basic Metabolic Panel (8) [[id number]] (Abnormal)  Resulted: 04/26/24 1041, Result status: Final result   Ordering provider: Herman Samson DO  04/26/24 1010 Resulting lab: [facility] ([facility])    Specimen Information    Type Source Collected On   Blood — 04/26/24  1017          Components    Component Value Reference Range Flag Lab   Glucose 182 70 - 99 mg/dL [facility] ([facility])   Sodium 138 136 - 145 mmol/L — [facility] ([facility])   Potassium 4.3 3.5 - 5.1 mmol/L — [facility] ([facility])   Chloride 108 98 - 112 mmol/L — [facility] ([facility])   CO2 23.0 21.0 - 32.0 mmol/L — [facility] ([facility])   Anion Gap 7 0 - 18 mmol/L — [facility] ([facility])   BUN 53 9 - 23 mg/dL [facility] ([facility])   Creatinine 1.99 0.70 - 1.30 mg/dL H [facility] ([facility])   Calcium, Total 9.0 8.5 - 10.1 mg/dL — [facility] ([facility])   Calculated Osmolality 305 275 - 295 mOsm/kg H [facility] ([facility])   eGFR-Cr 34 >=60 mL/min/1.73m2 L [facility] ([facility])            CBC With Differential With Platelet [[id number]]  Resulted: 04/26/24 1025, Result status: Final result   Ordering provider: Herman Samson DO  04/26/24 1010 Resulting lab: [facility] ([facility])   Narrative:  The following orders were created for panel order CBC With Differential With Platelet.  Procedure                               Abnormality         Status                     ---------                               -----------         ------                     CBC W/ DIFFERENTIAL[[id number]]                              Final result                 Please view results for these tests on the individual orders.    Specimen Information    Type Source Collected On   Blood — 04/26/24 1017            Testing Performed By     Lab - Abbreviation Name Director Address Valid Date Range    139 - [facility] ([facility]) [facility] ([facility]) Goldberg, Cathryn A. MD [address] [place] [id number] 03/19/20 1441 - Present            Microbiology Results (All)     None         H&P - H&P Note      H&P signed by Braulio Guerrero MD at 4/21/2024 12:39 PM  Version 1 of 1    Author: Braulio Guerrero MD Service: Hospitalist Author Type: Physician    Filed: 4/21/2024 12:39 PM Date of Service: 4/21/2024 12:32 PM Status: Signed    :  Braulio Guerrero MD (Physician)           [facility]   part of [facility]     Hospitalist Progress Note     Doug Johnson Patient Status:  Inpatient    1950 MRN [id number]   Location [facility] 7NE-A Attending Braulio Guerrero MD   Hosp Day # 1 PCP Jean Lunsford MD     Chief Complaint: stroke     Subjective:     Patient doing ok.  Was initially refusing insulin.  Still w R sided weakness.      Objective:    Review of Systems:   A comprehensive review of systems was completed; pertinent positive and negatives stated in subjective.    Vital signs:  Temp:  [97.5 °F (36.4 °C)-98.5 °F (36.9 °C)] 98.4 °F (36.9 °C)  Pulse:  [63-98] 63  Resp:  [15-21] 16  BP: (137-174)/(74-87) 137/74  SpO2:  [94 %-100 %] 99 %    Physical Exam:    General: No acute distress  Respiratory: No wheezes, no rhonchi  Cardiovascular: S1, S2, regular rate and rhythm  Abdomen: Soft, Non-tender, non-distended, positive bowel sounds  Neuro: No new focal deficits.   Extremities: RUE and RLL weakness      Diagnostic Data:    Labs:  Recent Labs   Lab 24  17224  0632   WBC 9.0 7.0   HGB 13.4 12.4*   MCV 82.5 81.9   .0 236.0   INR 1.00  --        Recent Labs   Lab 24  0632   * 255*   BUN 33* 37*   CREATSERUM 1.85* 1.76*   CA 8.9 8.8   ALB 3.6  --     140   K 4.1 3.8    109   CO2 22.0 25.0   ALKPHO 99  --    AST 25  --    ALT 23  --    BILT 0.4  --    TP 7.3  --        CrCl cannot be calculated (Unknown ideal weight.).    Recent Labs   Lab 24  172   TROPHS 10       Recent Labs   Lab 24   PTP 13.2   INR 1.00              # acute/subactue CVA anterolateral inferior L thalamus  # focal stenosis of R and L posterior cerebral arteries  - CVA order set, permissive HTN 48 h, if BP remain elevated tomorrow consider starting anti- HTN such as ace  - asa, plavix, statin  - ECHO nml EF, no clot  - seen by speech and cleared for po  - PT/OT rec acute  rehab  - holter on dc (neuro ordered)    # DM2, uncontrolled, insulin dependent  - hga1c 12.1.  on humilin u500 70 u qam and 30-40 u q pm  - tresiba 35 u daily, carb ratio 1:7 and medium dose ssi here.    - adjust as needed    # elevated cr  - suspect may be CKD, last cr 2017 1.06.         hep subcutaneous      Electronically signed by Braulio Guerrero MD on 2024 12:39 PM              Consults - MD Consult Notes      Consults signed by Liza Morton DO at 2024  2:46 PM     Author: Liza Motron DO Service: Nephrology Author Type: Physician    Filed: 2024  2:46 PM Date of Service: 2024  2:27 PM Status: Addendum    : Liza Morton DO (Physician)    Related Notes: Original Note by Liza Morton DO (Physician) filed at 2024  2:45 PM     Consult Orders    1. Consult to Nephrology [[id number]] ordered by Herman Samson DO at 24 0947             [facility] - Nephrology  Inpatient Consultation    Doug Johnson Patient Status:  Inpatient    1950 MRN [id number]   [address] Attending eHrman Samson DO   Hosp Day # 4 PCP Jean Lunsford MD     Reason for consult: CKD stage 3b  Date of consultation: 2024     HISTORY OF PRESENT ILLNESS:     Doug Johnson is a 74 year old male with a medical history notable for hypertension, diabetes, who presented with acute/subacute CVA. Nephrology consulted for CKD management.     Patient noted to have arm weakness and numbness and diagnosed with CVA. Currently with hypertension, not taking any medications. History of CKD per chart review for past couple of years. Never seen nephrology before. Denies any urinary complaints. Denies NSAID use. History of uncontrolled diabetes. Nephrology consulted for further workup and management of CKD.    REVIEW OF SYSTEMS:     ROS as above, otherwise negative    HISTORY:     Past Medical History:    Arrhythmia    Diabetes mellitus (HCC)    High blood pressure    Type  II or unspecified type diabetes mellitus without mention of complication, not stated as uncontrolled     Past Surgical History:   Procedure Laterality Date    Colonoscopy      2005    Tonsillectomy       Family History   Problem Relation Age of Onset    Ear Problems Father         hearing loss    Bleeding Disorders Sister     Cancer Paternal Grandfather         lung    Cancer Paternal Uncle         lung      reports that he has never smoked. He has never used smokeless tobacco. He reports that he does not drink alcohol and does not use drugs.  No Known Allergies    MEDICATIONS:       Current Facility-Administered Medications:     hydrALAzine (Apresoline) 20 mg/mL injection 10 mg, 10 mg, Intravenous, Q6H PRN    losartan (Cozaar) tab 25 mg, 25 mg, Oral, Daily    docusate sodium (Colace) cap 100 mg, 100 mg, Oral, BID    polyethylene glycol (PEG 3350) (Miralax) 17 g oral packet 17 g, 17 g, Oral, Daily PRN    magnesium hydroxide (Milk of Magnesia) 400 MG/5ML oral suspension 30 mL, 30 mL, Oral, Daily PRN    bisacodyl (Dulcolax) 10 MG rectal suppository 10 mg, 10 mg, Rectal, Daily PRN    fleet enema (Fleet) 7-19 GM/118ML rectal enema 133 mL, 1 enema, Rectal, Once PRN    insulin degludec 100 units/mL flextouch 35 Units, 35 Units, Subcutaneous, Daily    aspirin chewable tab 81 mg, 81 mg, Oral, Daily    acetaminophen (Tylenol) tab 650 mg, 650 mg, Oral, Q4H PRN **OR** acetaminophen (Tylenol) rectal suppository 650 mg, 650 mg, Rectal, Q4H PRN    labetalol (Trandate) 5 mg/mL injection 10 mg, 10 mg, Intravenous, Q10 Min PRN    ondansetron (Zofran) 4 MG/2ML injection 4 mg, 4 mg, Intravenous, Q6H PRN    metoclopramide (Reglan) 5 mg/mL injection 10 mg, 10 mg, Intravenous, Q8H PRN    heparin (Porcine) 5000 UNIT/ML injection 5,000 Units, 5,000 Units, Subcutaneous, Q8H JUAN JOSE    glucose (Dex4) 15 GM/59ML oral liquid 15 g, 15 g, Oral, Q15 Min PRN **OR** glucose (Glutose) 40% oral gel 15 g, 15 g, Oral, Q15 Min PRN **OR** glucose-vitamin C  (Dex-4) chewable tab 4 tablet, 4 tablet, Oral, Q15 Min PRN **OR** dextrose 50% injection 50 mL, 50 mL, Intravenous, Q15 Min PRN **OR** glucose (Dex4) 15 GM/59ML oral liquid 30 g, 30 g, Oral, Q15 Min PRN **OR** glucose (Glutose) 40% oral gel 30 g, 30 g, Oral, Q15 Min PRN **OR** glucose-vitamin C (Dex-4) chewable tab 8 tablet, 8 tablet, Oral, Q15 Min PRN    acetaminophen (Tylenol Extra Strength) tab 500 mg, 500 mg, Oral, Q4H PRN    atorvastatin (Lipitor) tab 80 mg, 80 mg, Oral, Nightly    clopidogrel (Plavix) tab 75 mg, 75 mg, Oral, Daily    insulin aspart (NovoLOG) 100 Units/mL FlexPen 1-68 Units, 1-68 Units, Subcutaneous, TID CC    insulin aspart (NovoLOG) 100 Units/mL FlexPen 1-10 Units, 1-10 Units, Subcutaneous, TID AC and HS    Medications Prior to Admission   Medication Sig Dispense Refill Last Dose    Magnesium Gluconate 500 (27 Mg) MG Oral Tab Take 0.4 tablets (200 mg total) by mouth daily.   2024 at 2100    HUMULIN R U-500 KWIKPEN 500 UNIT/ML Subcutaneous Solution Pen-injector Inject 0.1 mL (50 Units total) into the skin 3 (three) times daily before meals. Pt takes 70U @1200  30-40u @2200       cholecalciferol 125 MCG (5000 UT) Oral Tab Take 1 tablet (5,000 Units total) by mouth daily.   2024 at 0900    Vitamin K, Phytonadione, 100 MCG Oral Tab Take by mouth.   2024 at 0900        PHYSICAL EXAM:     Vital Signs: /44 (BP Location: Right arm)   Pulse 87   Temp 98.1 °F (36.7 °C) (Oral)   Resp 16   Wt 215 lb (97.5 kg)   SpO2 93%   BMI 30.85 kg/m²   Temp (24hrs), Av.6 °F (36.4 °C), Min:97.3 °F (36.3 °C), Max:98.1 °F (36.7 °C)       Intake/Output Summary (Last 24 hours) at 2024 1427  Last data filed at 2024 1242  Gross per 24 hour   Intake --   Output 875 ml   Net -875 ml     Wt Readings from Last 3 Encounters:   24 215 lb (97.5 kg)   10/07/22 210 lb (95.3 kg)   19 219 lb 4.8 oz (99.5 kg)       General: no acute distress  HEENT: normocephalic, atraumatic  CV:  RRR  Respiratory: no respiratory distress  Abdomen: soft, non-tender  Extremities: no edema bilaterally  Skin: no rashes on visible skin  Neuro: alert and oriented x3    LABORATORY DATA:     Lab Results   Component Value Date     (H) 04/24/2024    BUN 42 (H) 04/24/2024    CREATSERUM 1.96 (H) 04/24/2024    ANIONGAP 5 04/24/2024    GFR 72 03/09/2017    GFRNAA 72 01/01/2013    GFRAA 87 01/01/2013    CA 8.8 04/24/2024    OSMOCALC 305 (H) 04/24/2024    ALKPHO 99 04/19/2024    AST 25 04/19/2024    ALT 23 04/19/2024    BILT 0.4 04/19/2024    TP 7.3 04/19/2024    ALB 3.6 04/19/2024    GLOBULIN 3.7 04/19/2024    AGRATIO 1.8 03/17/2014     04/24/2024    K 4.3 04/24/2024     04/24/2024    CO2 23.0 04/24/2024     Lab Results   Component Value Date    WBC 8.9 04/24/2024    RBC 4.83 04/24/2024    HGB 13.2 04/24/2024    HCT 39.0 04/24/2024    .0 04/24/2024    MPV 10.0 01/01/2013    MCV 80.7 04/24/2024    MCH 27.3 04/24/2024    MCHC 33.8 04/24/2024    RDW 13.3 04/24/2024    NEPRELIM 5.52 04/24/2024    NEPERCENT 62.1 04/24/2024    LYPERCENT 24.9 04/24/2024    MOPERCENT 9.9 04/24/2024    EOPERCENT 2.7 04/24/2024    BAPERCENT 0.2 04/24/2024    NE 5.52 04/24/2024    LYMABS 2.22 04/24/2024    MOABSO 0.88 04/24/2024    EOABSO 0.24 04/24/2024    BAABSO 0.02 04/24/2024     No results found for: \"MALBP\", \"CREUR\", \"CREAURINE\", \"MIALBURINE\", \"MCRRATIOUR\", \"MALBCRECALC\", \"MICROALBUMIN\", \"CREAUR\", \"MALBCREACALC\"  Lab Results   Component Value Date    COLORUR Light-Yellow 04/19/2024    CLARITY Clear 04/19/2024    SPECGRAVITY 1.017 04/19/2024    GLUUR 300 (A) 04/19/2024    BILUR Negative 04/19/2024    KETUR 10 (A) 04/19/2024    BLOODURINE 2+ (A) 04/19/2024    PHURINE 5.5 04/19/2024    PROUR 100 (A) 04/19/2024    UROBILINOGEN Normal 04/19/2024    NITRITE Negative 04/19/2024    LEUUR Negative 04/19/2024    NMIC Microscopic not indicated 03/09/2017    WBCUR 1-5 04/19/2024    RBCUR >10 (A) 04/19/2024    EPIUR None Seen  04/19/2024    TERA None Seen 04/19/2024       IMAGING:     Reviewed    ASSESSMENT:     # CKD stage 3b  -Baseline creatinine 1.8-2.0  -Urinalysis with glucosuria, proteinuria, hematuria    # Hypertension    PLAN:     -Will start losartan 25mg daily in setting of hypertension, diabetes, CKD  -Monitor kidney function closely - will need outpatient labs in 7-10 days after initiation of losartan  -Will obtain renal ultrasound for structural evaluation  -Will obtain urinalysis, urine protein quantification studies.   -Counseled on adequate fluid intake at least 48oz daily  -Follow-up outpatient with Nephrology - will place through [facility] system  -Avoid nephrotoxins and renally dose medications for creatinine clearance  -Monitor intake and output daily  -Daily weights          Okay for discharge from nephrology perspective. Will follow-up outpatient.    Thank you for allowing us to participate in the care of this patient.         Liza Morton DO  [facility] - Nephrology            Electronically signed by Liza Morton DO on 4/24/2024  2:46 PM              Discharge Summary - D/C Summary      Discharge Summary signed by Herman Samson DO at 4/26/2024 11:19 AM  Version 1 of 1    Author: Herman Samson DO Service: Hospitalist Author Type: Physician    Filed: 4/26/2024 11:19 AM Date of Service: 4/26/2024  1:10 AM Status: Signed    : Herman Samson DO (Physician)                                                        [facility] Internal Medicine Hospitalist Discharge Summary     Patient ID:  Doug Johnson  74 year old  2/5/1950    Admit date: 4/19/2024    Discharge date and time: 4/26/2024    Attending Physician: Herman Samson DO     Primary Care Physician: Jean Lunsford MD     Discharge Diagnoses:  acute/subactue CVA anterolateral inferior L thalamus  ocal stenosis of R and L posterior cerebral arteries  R sided weakness due to CVA  Essential HTN  DM II  CKD stage 3    Please  note that only IHP DMG and EMG patients enrolled in the Medicare ACO, [facility] ACO and [facility] HMOs will be handled by the [facility] Care Management team.  For all other patients, please follow usual protocol for discharge care transition.    Discharge Condition: stable    Disposition:   Acute rehab     Important Follow up:  - PCP within 1 week of rehab discharge   - Consults: Neuro, renal, PMR    Follow Up Items:  Repeat BMP per renal      Hospital Course:      74 year old male with PMH sig for DM2, presents to ER for eval R arm and L weakness and numbness.      # acute/subactue CVA anterolateral inferior L thalamus  # focal stenosis of R and L posterior cerebral arteries  # R sided weakness due to CVA  # Essential HTN  - CVA order set, permissive HTN 48 h, if BP remain elevated tomorrow consider starting anti- HTN such as ace  - asa, plavix, statin  - ECHO nml EF, no clot  - seen by speech and cleared for po  - PT/OT rec acute rehab  - holter on dc (neuro ordered)   - cont normotension per neuro.  D/w renal, start losartan 25 mg po daily today.  Monitor BMP as outpt     # DM2, uncontrolled, insulin dependent  - hga1c 12.1.  on humilin u500 70 u qam and 30-40 u q pm  - tresiba 35 u daily, carb ratio 1:7 and medium dose ssi here.    - adjust as needed    # elevated cr, suspected CKD stage 3  - suspect may be CKD, last cr 2017 1.06.    - renal c/s, d/w Dr. Morton   - monitor as outpt     Stable for discharge to  acute rehab.     Consults: IP CONSULT TO NEUROLOGY  IP CONSULT TO SOCIAL WORK  IP CONSULT TO PHARMACY  IP CONSULT TO PHYSICAL MEDICINE REHAB  IP CONSULT TO SOCIAL WORK    Operative Procedures:  None      Patient instructions:      I as the attending physician reconciled the current and discharge medications on day of discharge.        Medication List        START taking these medications      aspirin 81 MG Chew  Chew 1 tablet (81 mg total) by mouth daily.     atorvastatin 80 MG Tabs  Commonly known as: Lipitor  Take  1 tablet (80 mg total) by mouth nightly.     clopidogrel 75 MG Tabs  Commonly known as: Plavix  Take 1 tablet (75 mg total) by mouth daily.     docusate sodium 100 MG Caps  Commonly known as: COLACE     losartan 25 MG Tabs  Commonly known as: Cozaar     Polyethylene Glycol 3350 17 g Pack  Commonly known as: MIRALAX            CONTINUE taking these medications      cholecalciferol 125 MCG (5000 UT) Tabs     HumuLIN R U-500 KwikPen 500 UNIT/ML Sopn  Generic drug: insulin regular human (conc)     Magnesium Gluconate 500 (27 Mg) MG Tabs     Vitamin K (Phytonadione) 100 MCG Tabs               Where to Get Your Medications        These medications were sent to [id number] - [facility] [address] AT [facility] [address], [phone], [phone] [address] [facility] [address]      Phone: [phone]   aspirin 81 MG Chew  atorvastatin 80 MG Tabs  clopidogrel 75 MG Tabs            Activity:  Up with assist  Diet: cardiac diet and diabetic diet  Wound Care: as directed  Code Status: No Order      Exam on day of discharge:     Vitals:    04/22/24 1230   BP: (!) 179/82   Pulse: 88   Resp: 17   Temp:        General: No acute distress.   HEENT:  EOMI, PERRLA, OP clear  Respiratory: Clear to auscultation bilaterally. No wheezes. No rhonchi.  Cardiovascular: S1, S2. Regular rate and rhythm. No murmurs.  Abdomen: Soft, nontender, nondistended.  Positive bowel sounds. No rebound or guarding.  Extremities: No edema.  Neuro:  +RUE and RLE weakness      Total time coordinating care for discharge: Greater than 30 minutes    Herman Samson DO  [facility] Hospitalist       Electronically signed by Herman Samson DO on 4/26/2024 11:19 AM              Physical Therapy Notes (last 72 hours)      Physical Therapy Note signed by Tracy Ontiveros PT at 4/25/2024  3:55 PM  Version 1 of 1    Author: Tracy Ontiveros PT Service: Rehab Author Type: Physical Therapist    Filed:  4/25/2024  3:55 PM Date of Service: 4/25/2024  2:30 PM Status: Signed    : Tracy Ontiveros PT (Physical Therapist)          PHYSICAL THERAPY TREATMENT NOTE - INPATIENT    Room Number: 7624/7624-A     Session: 4     Number of Visits to Meet Established Goals: 5    Presenting Problem: CVA:L thalamus subacute infarc  Co-Morbidities : [place], right glenohumeral arthritis, DMII    History related to current admission: Patient is a 74 year old male admitted on 4/19/2024 from home for R side weakness. Pt diagnosed with recent subacute infarct within the anterolateral inferior left thalamus.     HOME SITUATION  Type of Home: House   Home Layout: Two level  Stairs to Enter : 2  Stairs to Bedroom: 14     Lives With: Spouse (daughter and family lives in the basement apartment)  Drives: Yes  Prior Level of [place]: Ind in all aspect of mobilities s any use of an A.D.    ASSESSMENT   Patient demonstrates fair progress this session, goals  remain in progress.    Patient continues to function below baseline with bed mobility, transfers, gait, stair negotiation, maintaining seated position, and standing prolonged periods.  Contributing factors to remaining limitations include decreased functional strength, decreased endurance/aerobic capacity, impaired sitting and standing balance, impaired coordination, impaired motor planning, decreased muscular endurance, medical status, and decreased compliance/participation.  Next session anticipate patient to progress bed mobility, transfers, gait, maintaining seated position, and standing prolonged periods.  Physical Therapy will continue to follow patient for duration of hospitalization.     Patient continues to benefit from continued skilled PT services: to facilitate return to prior level of function as patient demonstrates high motivation with excellent tolerance to an intensive therapy program .    PLAN  PT Treatment Plan: Bed mobility;Body  mechanics;Coordination;Endurance;Energy conservation;Patient education;Family education;Gait training;Range of motion;Strengthening;Stair training;Transfer training;Balance training  Rehab Potential : Good  Frequency (Obs): 3-5x/week    CURRENT GOALS   Goal #1 Patient is able to demonstrate supine - sit EOB @ level: supervision assistance   MET at [facility]    Goal #2 Patient is able to demonstrate transfers Sit to/from Stand at assistance level: minimum assistance      Goal #3 Patient is able to ambulate 15 feet with assist device: walker - rolling at assistance level: moderate assistance/min A      Goal #4 Pt will be ind/mod I in HEP for B LE while seated/supine      Goal #5     Goal #6     Goal Comments: Goals established on 2024 all goals ongoing     SUBJECTIVE  \"I like Mark Dimas\"    OBJECTIVE  Precautions:  (SBP <190)    WEIGHT BEARING RESTRICTION  Weight Bearing Restriction: None                PAIN ASSESSMENT   Ratin  Location: patient reporting fatigue vs. pain today       BALANCE                                                                                                                       Static Sitting: Poor +  Dynamic Sitting: Poor +           Static Standing: Poor +  Dynamic Standing: Poor    ACTIVITY TOLERANCE                         O2 WALK         AM-PAC '6-Clicks' INPATIENT SHORT FORM - BASIC MOBILITY  How much difficulty does the patient currently have...  Patient Difficulty: Turning over in bed (including adjusting bedclothes, sheets and blankets)?: A Little   Patient Difficulty: Sitting down on and standing up from a chair with arms (e.g., wheelchair, bedside commode, etc.): A Little   Patient Difficulty: Moving from lying on back to sitting on the side of the bed?: A Little   How much help from another person does the patient currently need...   Help from Another: Moving to and from a bed to a chair (including a wheelchair)?: A Little   Help from Another: Need to  walk in hospital room?: A Lot   Help from Another: Climbing 3-5 steps with a railing?: A Lot       AM-PAC Score:  Raw Score: 16   Approx Degree of Impairment: 54.16%   Standardized Score (AM-PAC Scale): 40.78   CMS Modifier (G-Code): CK    FUNCTIONAL ABILITY STATUS  Gait Assessment   Functional Mobility/Gait Assessment  Gait Assistance: Maximum assistance  Distance (ft): 10, 10, 10, 15, 15,  Assistive Device: Rolling walker (Walking pants)  Pattern: Ataxic;R Foot drag    Skilled Therapy Provided    Gait Training:   Bed removed from pt room. Performed ambulation with overhead lift and walking pants. - performed anterior walking 10ft x3, ambulated 15ft with turns x2   Static standing with RLE AP tapping to promote sagittal plane movement - challenged with anterior swing  Pt req assist with R dorsiflexion and anterior translation of foot during swing phase  Performed sequencing of gait with pt verbalizing \"walker, R leg, L leg\", pt req frequent correction as he often performs and verbalizes LLE first  PT positioned on ground assisting with RLE swing phase, RW resistance provided by PT's R shoulder  Pt's R hand wrapped onto RW with ace bandage    Therapeutic Activity:   Lateral scooting transfer to L bed>chair with Martha for knee block and balance  Pt cued on placement of UE and LEs for optimal force generation and safe STS transfer, LUE to push from arm rest  Pt cued on usage of arm rests for controlled descent into sitting - LUE to reach back for L arm rest  Sit to stand x 5 throughout session      Bed Mobility:  Rolling: NT   Supine<>Sit: CGA   Sit<>Supine: NT    Transfer Mobility:  Sit<>Stand: min-modA   Stand<>Sit: min-modA   Gait: maxA (walking pants)    Therapist's Comments: RN cleared for session. Pt agreeable for therapy, received supine. Sister present for session. Instructed to call for nursing staff for any needs and OOB mobility.     Patient End of Session: Up in chair;Needs met;Call light within reach;RN  aware of session/findings;All patient questions and concerns addressed;Alarm set;Family present    PT Session Time: 45 minutes  Gait Trainin minutes  Therapeutic Activity: 15 minutes                 Physical Therapy Note signed by Tracy Ontiveros PT at 2024  3:18 PM  Version 1 of 1    Author: Tracy Ontiveros PT Service: Rehab Author Type: Physical Therapist    Filed: 2024  3:18 PM Date of Service: 2024  2:05 PM Status: Signed    : Tracy Ontiveros PT (Physical Therapist)          PHYSICAL THERAPY TREATMENT NOTE - INPATIENT    Room Number: 7624/7624-A     Session: 3     Number of Visits to Meet Established Goals: 5    Presenting Problem: CVA:L thalamus subacute infarc  Co-Morbidities : [place], right glenohumeral arthritis, DMII      History related to current admission: Patient is a 74 year old male admitted on 2024 from home for R side weakness. Pt diagnosed with Recent subacute infarct within the anterolateral inferior left thalamus.     HOME SITUATION  Type of Home: House   Home Layout: Two level  Stairs to Enter : 2  Stairs to Bedroom: 14     Lives With: Spouse (daughter and family lives in the basement apartment)  Drives: Yes  Prior Level of [place]: Ind in all aspect of mobilities s any use of an A.D.    ASSESSMENT   Patient demonstrates fair progress this session, goals  remain in progress.    Patient continues to function below baseline with bed mobility, transfers, gait, stair negotiation, maintaining seated position, and standing prolonged periods.  Contributing factors to remaining limitations include decreased functional strength, decreased endurance/aerobic capacity, impaired sitting and standing balance, impaired coordination, impaired motor planning, decreased muscular endurance, medical status, and decreased compliance/participation.  Next session anticipate patient to progress bed mobility, transfers, gait, maintaining seated position, and standing prolonged  periods.  Physical Therapy will continue to follow patient for duration of hospitalization.    Patient continues to benefit from continued skilled PT services: to facilitate return to prior level of function as patient demonstrates high motivation with excellent tolerance to an intensive therapy program .    PLAN  PT Treatment Plan: Bed mobility;Body mechanics;Coordination;Endurance;Energy conservation;Patient education;Family education;Gait training;Range of motion;Strengthening;Stair training;Transfer training;Balance training  Rehab Potential : Good  Frequency (Obs): 3-5x/week    CURRENT GOALS     Goal #1 Patient is able to demonstrate supine - sit EOB @ level: minimum assistance      Goal #2 Patient is able to demonstrate transfers Sit to/from Stand at assistance level: minimum assistance      Goal #3 Patient is able to ambulate 15 feet with assist device: walker - rolling at assistance level: moderate assistance/min A      Goal #4 Pt will be ind/mod I in HEP for B LE while seated/supine      Goal #5     Goal #6     Goal Comments: Goals established on 2024 all goals ongoing    SUBJECTIVE  \"You guys woke me up from sleep\" - pt with mild perseveration throughout session  Pt very hard on himself throughout session, wishing he could work harder, stating how he is not helping the therapists much     OBJECTIVE  Precautions:  (SBP <190)    WEIGHT BEARING RESTRICTION  Weight Bearing Restriction: None                PAIN ASSESSMENT   Ratin  Location: patient reporting fatigue vs. pain today       BALANCE                                                                                                                       Static Sitting: Fair -  Dynamic Sitting: Poor +           Static Standing: Poor +  Dynamic Standing: Poor    ACTIVITY TOLERANCE  Pulse: 87  Heart Rate Source: Monitor                   O2 WALK         AM-PAC '6-Clicks' INPATIENT SHORT FORM - BASIC MOBILITY  How much difficulty does  the patient currently have...  Patient Difficulty: Turning over in bed (including adjusting bedclothes, sheets and blankets)?: A Lot   Patient Difficulty: Sitting down on and standing up from a chair with arms (e.g., wheelchair, bedside commode, etc.): A Lot   Patient Difficulty: Moving from lying on back to sitting on the side of the bed?: A Lot   How much help from another person does the patient currently need...   Help from Another: Moving to and from a bed to a chair (including a wheelchair)?: A Lot   Help from Another: Need to walk in hospital room?: A Lot   Help from Another: Climbing 3-5 steps with a railing?: A Lot       AM-PAC Score:  Raw Score: 12   Approx Degree of Impairment: 68.66%   Standardized Score (AM-PAC Scale): 35.33   CMS Modifier (G-Code): CL    FUNCTIONAL ABILITY STATUS  Gait Assessment   Functional Mobility/Gait Assessment  Gait Assistance: Maximum assistance  Distance (ft): 1  Assistive Device: Rolling walker  Pattern: Ataxic    Skilled Therapy Provided    Gait Training:   Pt cued on upright standing posture to improve alignment with UEs  Pt participated with pre-gait activities- weight shifting and marching, pt able to maintain RLE stance phase with LLE in air for >3seconds  Pt cued on gait sequencing with RW - pt req verbal and tactile cuing for RLE sequencing, increased ataxia with attempted steps - progressive increased in weakness and muscular fatigue   Pt cued on proper UE placement on RW handles - req initial placement of R hand  - able to maintain , however difficulty with wrist extension  Pt able to recognize imbalance and leaning in standing, req assist with finding midline standing balance   Pt able to perform side steps along EOB x3', pt with progressive muscular fatigue, unable to progress anterior ambulation, opted for pivot transfer     Therapeutic Activity:   Pt cued on rolling laterally for supine>sit transfer, req tactile cuing with RLE translation to EOB   Pt cued  on placement of UE and LEs for optimal force generation and safe STS transfer- pt able to recognize RLE too far anteriorly for sit>stand, req assist for R foot re-positioning  Pt cued on controlled descent into sitting  Pt cued on reaching towards lateral arm rest during pivot transfer  Pt cued on scooting posteriorly in chair for improved static supported sitting - pt req modA to assist (difficulty pushing off BLEs due to increased sitting height from pillow and buttock cushion in chair)          Bed Mobility:  Rolling: NT  Supine<>Sit: modA   Sit<>Supine: NT     Transfer Mobility:  Sit<>Stand: Martha   Stand<>Sit: Martha   Gait: maxA    Therapist's Comments: RN cleared for session. Pt agreeable for therapy, received supine. Sister present for session. Instructed to call for nursing staff for any needs and OOB mobility.    Patient End of Session: Up in chair;Needs met;Call light within reach;RN aware of session/findings;All patient questions and concerns addressed;Alarm set;Family present    PT Session Time: 35 minutes  Gait Training: 15 minutes  Therapeutic Activity: 15 minutes                          Occupational Therapy Notes (last 72 hours)      Occupational Therapy Note signed by Anais Waldrop OT at 4/25/2024  3:51 PM  Version 1 of 1    Author: Anais Waldrop OT Service: Rehab Author Type: Occupational Therapist    Filed: 4/25/2024  3:51 PM Date of Service: 4/25/2024  3:48 PM Status: Signed    : Anais Waldrop OT (Occupational Therapist)       OCCUPATIONAL THERAPY TREATMENT NOTE - INPATIENT     Room Number: 7624/7624-A  Session: 2  Number of Visits to Meet Established Goals: 7    Presenting Problem: Acute CVA      Prior Level of Function: independent with ADLs and functional mobility without use of adaptive device.  Patient reports no falls     ASSESSMENT   Patient demonstrates good  progress this session, goals remain in progress.    Patient continues to function below baseline with upper body  dressing, lower body dressing, bed mobility, and transfers.   Contributing factors to remaining limitations include decreased functional strength, decreased functional reach, decreased endurance, impaired sitting and standing balance, impaired coordination, and limited RUE/RLE active ROM.  Next session anticipate patient to progress grooming, bed mobility, and transfers.  Occupational Therapy will continue to follow patient for duration of hospitalization.    Patient continues to benefit from continued skilled OT services: to facilitate return to prior level of function as patient demonstrates high motivation with excellent tolerance to an intensive therapy program .          OT Device Recommendations: TBD    History: : Patient is a 74 year old male admitted on 4/19/2024 with Presenting Problem: Acute CVA. Co-Morbidities : [place], right glenohumeral arthritis, DMII     Imaging  MRI of brain 4/19/24 CONCLUSION:     Recent subacute infarct with a subcentimeter focus of restricted diffusion, but also showing increased T2/FLAIR signal indicating subacute nature of the infarct within the anterolateral inferior left thalamus.  Focal stenosis identified involving both right and left proximal posterior cerebral arteries.     WEIGHT BEARING RESTRICTION  Weight Bearing Restriction: None                Recommendations for nursing staff:   Transfers: Sera Stedy  Toileting location: commode    TREATMENT SESSION:  Patient Start of Session: supine  FUNCTIONAL TRANSFER ASSESSMENT  Sit to Stand: Edge of Bed; Chair  Edge of Bed: Minimal Assist  Chair: Minimal Assist  Commode Transfer: Moderate Assist    BED MOBILITY  Rolling: Minimal Assist  Supine to Sit : Minimal Assist  Sit to Supine (OT): Not Tested  Scooting: CGA    BALANCE ASSESSMENT  Static Sitting: Stand-by Assist  Static Standing: Minimal Assist  Standing Unilateral: Moderate Assist    FUNCTIONAL ADL ASSESSMENT  Eating: Minimal Assist (assist w/ set-up; pt able to feed self  with use of left hand)  Grooming Seated: Stand-by Assist (and cues to use modified tech w/ use of left UE as primary)  LB Dressing Seated: Maximum Assist  Toileting Seated: Maximum Assist  Toileting Standing: Maximum Assist      ACTIVITY TOLERANCE:  Tolerated standing and walking x 4 min x 6 reps w/ min c/o fatigue.      Sitting BP:140/67      EDUCATION PROVIDED  Patient: Role of Occupational Therapy; Plan of Care; Discharge Recommendations; Adaptive Equipment Recommendations; Functional Transfer Techniques; Fall Prevention; Energy Conservation; Compensatory ADL Techniques; Proper Body Mechanics  Patient's Response to Education: Verbalized Understanding; Requires Further Education  Family/Caregiver: Role of Occupational Therapy; Plan of Care; Discharge Recommendations; Fall Prevention; Energy Conservation; Compensatory ADL Techniques; Proper Body Mechanics; UE HEP for Strengthening  Family/Caregiver's Response to Education: Verbalized Understanding; Requires Further Education      Equipment used: RW, gait belt, hospital bed  Demonstrates functional use, Would benefit from additional trial      Exercises: RUE AAROM ex:    Exercises Repetitions Comments   Scapular elevation 10    Scapular retraction 10    Shoulder rolls     Shoulder flexion 10    Shoulder abduction 10    Shoulder internal/external rotation     Forward punch 10    Elbow flexion 10    Elbow extension 10    Forearm pronation/supination 10    Wrist flexion/extension 10    Gross grasp/fist pumps 10    Ankle pumps     Knee extension     Marching 10         Therapist comments: patient requires frequent reassurance that he is doing well.  Pt readily engaged in all tasks presented with frequent redirection.  Facilitated RUE WB, AAROM and instruction in positioning when at rest in chair or bed.  Patient and sister were educated on importance of actively using RUE.  Patient will need further education.  Sister verbalized understanding.  Patient instructed in  weight shifting, hand placement, body mechanics and safety during sit to stand transfers from EOB, chair and commode.    Patient End of Session: Up in chair;With  staff;Needs met;Call light within reach;RN aware of session/findings;All patient questions and concerns addressed;Alarm set;Family present;Discussed recommendations with /    SUBJECTIVE  \"I need to do more.\"  \"I can't do too much.\"    PAIN ASSESSMENT  Ratin  Location: denies        OBJECTIVE  Precautions:  (SBP <190)    AM-PAC ‘6-Clicks’ Inpatient Daily Activity Short Form  -   Putting on and taking off regular lower body clothing?: A Lot  -   Bathing (including washing, rinsing, drying)?: A Lot  -   Toileting, which includes using toilet, bedpan or urinal? : A Lot  -   Putting on and taking off regular upper body clothing?: A Lot  -   Taking care of personal grooming such as brushing teeth?: A Little  -   Eating meals?: A Little    AM-PAC Score:  Score: 14  Approx Degree of Impairment: 59.67%  Standardized Score (AM-PAC Scale): 33.39    PLAN  OT Treatment Plan: Balance activities;Energy conservation/work simplification techniques;ADL training;Functional transfer training;UE strengthening/ROM;Endurance training;Patient/Family education;Patient/Family training;Equipment eval/education;Fine motor coordination activities;Neuromuscluar reeducation;Compensatory technique education  Rehab Potential : Good  Frequency: 3-5x/week    (ongoing 2024)    ADL Goals   Patient will perform eating: with modified independent and with adaptive equipment PRN  Patient will perform grooming: with setup and with adaptive equipment PRN  Patient will perform lower body dressing:  with mod assist, while in chair, and with adaptive equipment PRN  Patient will perform toileting: with mod assist     Functional Transfer Goals  Patient will transfer in bed by rolling:  with modified independent  Patient will transfer from bed to chair:  with min  assist  Patient will transfer from sit to supine:  with min assist  Patient will transfer from supine to sit:  with min assist  Patient will transfer from sit to stand:  with min assist  Patient will transfer to bedside commode:  with min assist     UE Exercise Program Goal  Patient will be supervision with right SAYDA MONTALVO (home exercise program).     Therapist Goals  Complete PhQ9-MET 4/22/24       OT Session Time: 30 minutes  Self-Care Home Management: 15 minutes  Therapeutic Activity: 15 minutes           Occupational Therapy Note signed by Anais Waldrop OT at 4/24/2024  5:54 PM  Version 3 of 3    Author: Anais Waldrop OT Service: Rehab Author Type: Occupational Therapist    Filed: 4/24/2024  5:54 PM Date of Service: 4/24/2024  5:36 PM Status: Addendum    : Anais Waldrop OT (Occupational Therapist)    Related Notes: Original Note by Anais Waldrop OT (Occupational Therapist) filed at 4/24/2024  5:54 PM       OCCUPATIONAL THERAPY TREATMENT NOTE - INPATIENT     Room Number: 7624/7624-A  Session: 1  Number of Visits to Meet Established Goals: 7    Presenting Problem: Acute CVA      Prior Level of Function: independent with ADLs and functional mobility without use of adaptive device.  Patient reports no falls     ASSESSMENT   Patient demonstrates good  progress this session, goals remain in progress.    Patient continues to function below baseline with upper body dressing, lower body dressing, bed mobility, and transfers.   Contributing factors to remaining limitations include decreased functional strength, decreased functional reach, decreased endurance, impaired sitting and standing balance, impaired coordination, and limited RUE/RLE active ROM.  Next session anticipate patient to progress grooming, bed mobility, and transfers.  Occupational Therapy will continue to follow patient for duration of hospitalization.    Patient continues to benefit from continued skilled OT services: to facilitate return  to prior level of function as patient demonstrates high motivation with excellent tolerance to an intensive therapy program .          OT Device Recommendations: TBD    History: : Patient is a 74 year old male admitted on 4/19/2024 with Presenting Problem: Acute CVA. Co-Morbidities : [place], right glenohumeral arthritis, DMII     Imaging  MRI of brain 4/19/24 CONCLUSION:     Recent subacute infarct with a subcentimeter focus of restricted diffusion, but also showing increased T2/FLAIR signal indicating subacute nature of the infarct within the anterolateral inferior left thalamus.  Focal stenosis identified involving both right and left proximal posterior cerebral arteries.     WEIGHT BEARING RESTRICTION  Weight Bearing Restriction: None                Recommendations for nursing staff:   Transfers: Banner Stedy  Toileting location: commode    TREATMENT SESSION:  Patient Start of Session: supine  FUNCTIONAL TRANSFER ASSESSMENT  Sit to Stand: Edge of Bed; Chair  Edge of Bed: Moderate Assist  Chair: Moderate Assist  Commode Transfer: Moderate Assist    BED MOBILITY  Rolling: Minimal Assist  Supine to Sit : Moderate Assist  Sit to Supine (OT): Not Tested  Scooting: min A    BALANCE ASSESSMENT  Static Sitting: Moderate Assist  Static Standing: Moderate Assist    FUNCTIONAL ADL ASSESSMENT  Eating: Minimal Assist (assist w/ set-up; pt able to feed self with use of left hand)  Grooming Seated: Stand-by Assist (and cues to use modified tech w/ use of left UE as primary)  LB Dressing Seated: Maximum Assist      ACTIVITY TOLERANCE:  Tolerated x2-3 min in standing x 2 reps   Sitting BP:138/50                           O2 SATURATIONS       EDUCATION PROVIDED  Patient: Role of Occupational Therapy; Plan of Care; Functional Transfer Techniques; Fall Prevention; Posture/Positioning; Energy Conservation; Compensatory ADL Techniques; Proper Body Mechanics; UE HEP for ROM  Patient's Response to Education: Verbalized Understanding;  Requires Further Education  Family/Caregiver: Role of Occupational Therapy; Plan of Care  Family/Caregiver's Response to Education: Verbalized Understanding      Equipment used: RW, gait belt, hospital bed  Demonstrates functional use, Would benefit from additional trial      Exercises: RUE AAROM ex:    Exercises Repetitions Comments   Scapular elevation 10    Scapular retraction 10    Shoulder rolls     Shoulder flexion 10    Shoulder abduction 10    Shoulder internal/external rotation     Forward punch 10    Elbow flexion 10    Elbow extension 10    Forearm pronation/supination 10    Wrist flexion/extension 10    Gross grasp/fist pumps 10    Ankle pumps     Knee extension     Marching 10         Therapist comments: patient requires frequent reassurance that he is doing well.  Patient c/o feeling sleepy and tired d/t no sleep in the hospital, however engaged in all activities presented.  Follows simple motor commands with increased time and redirecting.  Facilitated RUE WB, AAROM and instruction in positioning when at rest in chair or bed.  Patient and sister were educated on importance of actively using RUE.  Patient will need further education.  Sister verbalized understanding.  Patient instructed in weight shifting, hand placement, body mechanics and safety during sit to stand transfers from EOB, chair and commode.    Patient End of Session: Up in chair;With  staff;Needs met;Call light within reach;RN aware of session/findings;All patient questions and concerns addressed;Alarm set;Family present;Discussed recommendations with /    SUBJECTIVE  \"I'm like a baby.\"  \"This leg is useless.\"  \"Sorry I couldn't help more.\"  (Throughout session)    PAIN ASSESSMENT  Ratin  Location: denies        OBJECTIVE  Precautions:  (SBP <190)    AM-PAC ‘6-Clicks’ Inpatient Daily Activity Short Form  -   Putting on and taking off regular lower body clothing?: A Lot  -   Bathing (including washing,  rinsing, drying)?: A Lot  -   Toileting, which includes using toilet, bedpan or urinal? : A Lot  -   Putting on and taking off regular upper body clothing?: A Lot  -   Taking care of personal grooming such as brushing teeth?: A Little  -   Eating meals?: A Little    AM-PAC Score:  Score: 14  Approx Degree of Impairment: 59.67%  Standardized Score (AM-PAC Scale): 33.39    PLAN  OT Treatment Plan: Balance activities;Energy conservation/work simplification techniques;ADL training;Functional transfer training;UE strengthening/ROM;Endurance training;Patient/Family education;Patient/Family training;Equipment eval/education;Fine motor coordination activities;Neuromuscluar reeducation;Compensatory technique education  Rehab Potential : Good  Frequency: 3-5x/week    (ongoing 4/24/2024)    ADL Goals   Patient will perform eating: with modified independent and with adaptive equipment PRN  Patient will perform grooming: with setup and with adaptive equipment PRN  Patient will perform lower body dressing:  with mod assist, while in chair, and with adaptive equipment PRN  Patient will perform toileting: with mod assist     Functional Transfer Goals  Patient will transfer in bed by rolling:  with modified independent  Patient will transfer from bed to chair:  with min assist  Patient will transfer from sit to supine:  with min assist  Patient will transfer from supine to sit:  with min assist  Patient will transfer from sit to stand:  with min assist  Patient will transfer to bedside commode:  with min assist     UE Exercise Program Goal  Patient will be supervision with right SAYDA MONTALVO (home exercise program).     Therapist Goals  Complete PhQ9-MET 4/22/24       OT Session Time: 30 minutes  Self-Care Home Management: 15 minutes  Therapeutic Activity: 15 minutes           Occupational Therapy Note signed by Anais Waldrop OT at 4/24/2024  5:54 PM  Version 2 of 3    Author: Anais Waldrop OT Service: Rehab Author Type:  Occupational Therapist    Filed: 4/24/2024  5:54 PM Date of Service: 4/24/2024  5:36 PM Status: Addendum    : Anais Waldrop OT (Occupational Therapist)    Related Notes: Addendum by Anais Waldrop OT (Occupational Therapist) filed at 4/24/2024  5:54 PM  Original Note by Anais Waldrop OT (Occupational Therapist) filed at 4/24/2024  5:53 PM       OCCUPATIONAL THERAPY TREATMENT NOTE - INPATIENT     Room Number: 7624/7624-A  Session: 1  Number of Visits to Meet Established Goals: 7    Presenting Problem: Acute CVA      Prior Level of Function: independent with ADLs and functional mobility without use of adaptive device.  Patient reports no falls     ASSESSMENT   Patient demonstrates good  progress this session, goals remain in progress.    Patient continues to function below baseline with upper body dressing, lower body dressing, bed mobility, and transfers.   Contributing factors to remaining limitations include decreased functional strength, decreased functional reach, decreased endurance, impaired sitting and standing balance, impaired coordination, and limited RUE/RLE active ROM.  Next session anticipate patient to progress grooming, bed mobility, and transfers.  Occupational Therapy will continue to follow patient for duration of hospitalization.    Patient continues to benefit from continued skilled OT services: to facilitate return to prior level of function as patient demonstrates high motivation with excellent tolerance to an intensive therapy program .          OT Device Recommendations: TBD    History: : Patient is a 74 year old male admitted on 4/19/2024 with Presenting Problem: Acute CVA. Co-Morbidities : [place], right glenohumeral arthritis, DMII     Imaging  MRI of brain 4/19/24 CONCLUSION:     Recent subacute infarct with a subcentimeter focus of restricted diffusion, but also showing increased T2/FLAIR signal indicating subacute nature of the infarct within the anterolateral inferior left  thalamus.  Focal stenosis identified involving both right and left proximal posterior cerebral arteries.     WEIGHT BEARING RESTRICTION  Weight Bearing Restriction: None                Recommendations for nursing staff:   Transfers: Sera Stedy  Toileting location: commode    TREATMENT SESSION:  Patient Start of Session: supine  FUNCTIONAL TRANSFER ASSESSMENT  Sit to Stand: Edge of Bed; Chair  Edge of Bed: Moderate Assist  Chair: Moderate Assist  Commode Transfer: Moderate Assist    BED MOBILITY  Rolling: Minimal Assist  Supine to Sit : Moderate Assist  Sit to Supine (OT): Not Tested  Scooting: min A    BALANCE ASSESSMENT  Static Sitting: Moderate Assist  Static Standing: Moderate Assist    FUNCTIONAL ADL ASSESSMENT  Eating: Minimal Assist (assist w/ set-up; pt able to feed self with use of left hand)  Grooming Seated: Stand-by Assist (and cues to use modified tech w/ use of left UE as primary)  LB Dressing Seated: Maximum Assist      ACTIVITY TOLERANCE:  Tolerated x2-3 min in standing x 2 reps   Sitting BP:138/50                           O2 SATURATIONS       EDUCATION PROVIDED  Patient: Role of Occupational Therapy; Plan of Care; Functional Transfer Techniques; Fall Prevention; Posture/Positioning; Energy Conservation; Compensatory ADL Techniques; Proper Body Mechanics; UE HEP for ROM  Patient's Response to Education: Verbalized Understanding; Requires Further Education  Family/Caregiver: Role of Occupational Therapy; Plan of Care  Family/Caregiver's Response to Education: Verbalized Understanding      Equipment used: RW, gait belt, hospital bed  Demonstrates functional use, Would benefit from additional trial      Exercises: EDWARDO ALFREDO ex:    Exercises Repetitions Comments   Scapular elevation 10    Scapular retraction 10    Shoulder rolls     Shoulder flexion 10    Shoulder abduction 10    Shoulder internal/external rotation     Forward punch 10    Elbow flexion 10    Elbow extension 10    Forearm  pronation/supination 10    Wrist flexion/extension 10    Gross grasp/fist pumps 10    Ankle pumps     Knee extension     Marching 10         Therapist comments: patient requires frequent reassurance that he is doing well.  Patient c/o feeling sleepy and tired d/t no sleep in the hospital, however engaged in all activities presented.  Follows simple motor commands with increased time and redirecting.  Facilitated RUE WB, AAROM and instruction in positioning when at rest in chair or bed.  Patient and sister were educated on importance of actively using RUE.  Patient will need further education.  Sister verbalized understanding.  Patient instructed in weight shifting, hand placement, body mechanics and safety during sit to stand transfers from EOB, chair and commode.    Patient End of Session: Up in chair;With  staff;Needs met;Call light within reach;RN aware of session/findings;All patient questions and concerns addressed;Alarm set;Family present;Discussed recommendations with /    SUBJECTIVE  \"I'm like a baby.\"  \"This leg is useless.\"  \"Sorry I couldn't help more.\"  (Throughout session)    PAIN ASSESSMENT  Ratin  Location: denies        OBJECTIVE  Precautions:  (SBP <190)    AM-PAC ‘6-Clicks’ Inpatient Daily Activity Short Form  -   Putting on and taking off regular lower body clothing?: A Lot  -   Bathing (including washing, rinsing, drying)?: A Lot  -   Toileting, which includes using toilet, bedpan or urinal? : A Lot  -   Putting on and taking off regular upper body clothing?: A Lot  -   Taking care of personal grooming such as brushing teeth?: A Little  -   Eating meals?: A Little    AM-PAC Score:  Score: 14  Approx Degree of Impairment: 59.67%  Standardized Score (AM-PAC Scale): 33.39    PLAN  OT Treatment Plan: Balance activities;Energy conservation/work simplification techniques;ADL training;Functional transfer training;UE strengthening/ROM;Endurance training;Patient/Family  education;Patient/Family training;Equipment eval/education;Fine motor coordination activities;Neuromuscluar reeducation;Compensatory technique education  Rehab Potential : Good  Frequency: 3-5x/week    ADL Goals   Patient will perform eating: with modified independent and with adaptive equipment PRN  Patient will perform grooming: with setup and with adaptive equipment PRN  Patient will perform lower body dressing:  with mod assist, while in chair, and with adaptive equipment PRN  Patient will perform toileting: with mod assist     Functional Transfer Goals  Patient will transfer in bed by rolling:  with modified independent  Patient will transfer from bed to chair:  with min assist  Patient will transfer from sit to supine:  with min assist  Patient will transfer from supine to sit:  with min assist  Patient will transfer from sit to stand:  with min assist  Patient will transfer to bedside commode:  with min assist     UE Exercise Program Goal  Patient will be supervision with right SAYDA MONTALVO (home exercise program).     Therapist Goals  Complete PhQ9-MET 4/22/24       OT Session Time: 30 minutes  Self-Care Home Management: 15 minutes  Therapeutic Activity: 15 minutes           Occupational Therapy Note signed by Anais Waldrop OT at 4/24/2024  5:53 PM  Version 1 of 3    Author: Anais Waldrop OT Service: Rehab Author Type: Occupational Therapist    Filed: 4/24/2024  5:53 PM Date of Service: 4/24/2024  5:36 PM Status: Signed    : Anais Waldrop OT (Occupational Therapist)    Related Notes: Addendum by Anais Waldrop OT (Occupational Therapist) filed at 4/24/2024  5:54 PM       OCCUPATIONAL THERAPY TREATMENT NOTE - INPATIENT     Room Number: 7624/7624-A  Session: 1  Number of Visits to Meet Established Goals: 7    Presenting Problem: Acute CVA      Prior Level of Function: independent with ADLs and functional mobility without use of adaptive device.  Patient reports no falls     ASSESSMENT   Patient  demonstrates good  progress this session, goals remain in progress.    Patient continues to function below baseline with upper body dressing, lower body dressing, bed mobility, and transfers.   Contributing factors to remaining limitations include decreased functional strength, decreased functional reach, decreased endurance, impaired sitting and standing balance, impaired coordination, and limited RUE/RLE active ROM.  Next session anticipate patient to progress grooming, bed mobility, and transfers.  Occupational Therapy will continue to follow patient for duration of hospitalization.    Patient continues to benefit from continued skilled OT services: to facilitate return to prior level of function as patient demonstrates high motivation with excellent tolerance to an intensive therapy program .          OT Device Recommendations: TBD    History: : Patient is a 74 year old male admitted on 4/19/2024 with Presenting Problem: Acute CVA. Co-Morbidities : [place], right glenohumeral arthritis, DMII     Imaging  MRI of brain 4/19/24 CONCLUSION:     Recent subacute infarct with a subcentimeter focus of restricted diffusion, but also showing increased T2/FLAIR signal indicating subacute nature of the infarct within the anterolateral inferior left thalamus.  Focal stenosis identified involving both right and left proximal posterior cerebral arteries.     WEIGHT BEARING RESTRICTION  Weight Bearing Restriction: None                Recommendations for nursing staff:   Transfers: Sera Stedy  Toileting location: commode    TREATMENT SESSION:  Patient Start of Session: supine  FUNCTIONAL TRANSFER ASSESSMENT  Sit to Stand: Edge of Bed; Chair  Edge of Bed: Moderate Assist  Chair: Moderate Assist  Commode Transfer: Moderate Assist    BED MOBILITY  Rolling: Minimal Assist  Supine to Sit : Moderate Assist  Sit to Supine (OT): Not Tested  Scooting: min A    BALANCE ASSESSMENT  Static Sitting: Moderate Assist  Static Standing: Moderate  Assist    FUNCTIONAL ADL ASSESSMENT  Eating: Minimal Assist (assist w/ set-up; pt able to feed self with use of left hand)  Grooming Seated: Stand-by Assist (and cues to use modified tech w/ use of left UE as primary)  LB Dressing Seated: Maximum Assist      ACTIVITY TOLERANCE:  Tolerated x2-3 min in standing x 2 reps   Sitting BP:138/50                           O2 SATURATIONS       EDUCATION PROVIDED  Patient: Role of Occupational Therapy; Plan of Care; Functional Transfer Techniques; Fall Prevention; Posture/Positioning; Energy Conservation; Compensatory ADL Techniques; Proper Body Mechanics; UE HEP for ROM  Patient's Response to Education: Verbalized Understanding; Requires Further Education  Family/Caregiver: Role of Occupational Therapy; Plan of Care  Family/Caregiver's Response to Education: Verbalized Understanding      Equipment used: RW, gait belt, hospital bed  Demonstrates functional use, Would benefit from additional trial      Exercises: RUE AAROM ex:    Exercises Repetitions Comments   Scapular elevation 10    Scapular retraction 10    Shoulder rolls     Shoulder flexion 10    Shoulder abduction 10    Shoulder internal/external rotation     Forward punch 10    Elbow flexion 10    Elbow extension 10    Forearm pronation/supination 10    Wrist flexion/extension 10    Gross grasp/fist pumps 10    Ankle pumps     Knee extension     Marching 10         Therapist comments: patient requires frequent reassurance that he is doing well.  Patient c/o feeling sleepy and tired d/t no sleep in the hospital, however engaged in all activities presented.  Follows simple motor commands with increased time and redirecting.  Facilitated RUE WB, AAROM and instruction in positioning when at rest in chair or bed.  Patient and sister were educated on importance of actively using RUE.  Patient will need further education.  Sister verbalized understanding.  Patient instructed in weight shifting, hand placement, body mechanics  and safety during sit to stand transfers from EOB, chair and commode.    Patient End of Session: Up in chair;With  staff;Needs met;Call light within reach;RN aware of session/findings;All patient questions and concerns addressed;Alarm set;Family present;Discussed recommendations with /    SUBJECTIVE  \"I'm like a baby.\"  \"This leg is useless.\"  \"Sorry I couldn't help more.\"  (Throughout session)    PAIN ASSESSMENT  Ratin  Location: denies        OBJECTIVE  Precautions:  (SBP <190)    AM-PAC ‘6-Clicks’ Inpatient Daily Activity Short Form  -   Putting on and taking off regular lower body clothing?: A Lot  -   Bathing (including washing, rinsing, drying)?: A Lot  -   Toileting, which includes using toilet, bedpan or urinal? : A Lot  -   Putting on and taking off regular upper body clothing?: A Lot  -   Taking care of personal grooming such as brushing teeth?: A Little  -   Eating meals?: A Little    AM-PAC Score:  Score: 14  Approx Degree of Impairment: 59.67%  Standardized Score (AM-PAC Scale): 33.39    PLAN  OT Treatment Plan: Balance activities;Energy conservation/work simplification techniques;ADL training;Functional transfer training;UE strengthening/ROM;Endurance training;Patient/Family education;Patient/Family training;Equipment eval/education;Fine motor coordination activities;Neuromuscluar reeducation;Compensatory technique education  Rehab Potential : Good  Frequency: 3-5x/week        OT Session Time: 30 minutes  Self-Care Home Management: 15 minutes  Therapeutic Activity: 15 minutes                 Video Swallow Study Notes    No notes of this type exist for this encounter.        SLP Note - SLP Notes      SLP Note signed by Obi Hernandez SLP at 2024  1:55 PM  Version 1 of 1    Author: Obi Hernandez SLP Service: Rehab Author Type: Speech and Language Pathologist    Filed: 2024  1:55 PM Date of Service: 2024  1:51 PM Status: Signed    : Obi Hernandez SLP  (Speech and Language Pathologist)       ADULT SWALLOWING EVALUATION    ASSESSMENT    ASSESSMENT/OVERALL IMPRESSION:  Patient is a 73 y/o male known to this service for prior dysphagia assessment and management. Clinical swallow evaluation completed 4/20 and recommended a regular diet and thin liquids. New SLP order received to re-evaluate given presence of coughing yesterday. Patient reported long history of occasional gagging with PO intake. He denied any recent pneumonia or unintended weight loss.    Patient presented with an intact oropharyngeal swallow. Bolus acceptance was adequate without evidence of anterior bolus loss. Mastication and AP bolus transit were thorough and efficient without evidence of oral residue. Pharyngeal swallow initiation appeared timely and hyolaryngeal excursion was adequate per palpation.  No overt s/s of aspiration observed and patient denied odynophagia and globus sensation across consistencies.     Recommend patient continue a regular diet and thin liquids. Bolus size and rate of intake should be limited. Patient should be in an upright seated position for all PO intake. Education provided re: aspiration precautions. SLP will continue to follow to monitor diet tolerance and adjust as appropriate.          RECOMMENDATIONS   Diet Recommendations - Solids: Regular  Diet Recommendations - Liquids: Thin Liquids                        Compensatory Strategies Recommended: Small bites and sips  Aspiration Precautions: Upright position  Medication Administration Recommendations: No restrictions  Treatment Plan/Recommendations: Communication evaluation    HISTORY   MEDICAL HISTORY  Reason for Referral: Stroke protocol    Problem List  Principal Problem:    Acute CVA (cerebrovascular accident) (HCC)      Past Medical History  Past Medical History:    Arrhythmia    Diabetes mellitus (HCC)    High blood pressure    Type II or unspecified type diabetes mellitus without mention of complication,  not stated as uncontrolled       Prior Living Situation: Home with spouse  Diet Prior to Admission: Regular;Thin liquids  Precautions: None    Patient/Family Goals: none stated    SWALLOWING HISTORY  Current Diet Consistency: Regular;Thin liquids (carb controlled)  Dysphagia History: as above  Imaging Results:   CXR 4/19/24  CONCLUSION:    Poor inspiration low lung volumes.  Cardiomegaly.  Left lower lobe patchy atelectasis or infiltrate. No sizable effusion, but small effusion difficult to exclude on portable chest x-ray. No evidence for pneumothorax.  Consider upright PA   and lateral examination of the chest, when tolerated.      LOCATION:  [facility]                  Dictated by (CST): Bonifacio Herrera MD on 4/19/2024 at 5:44 PM       Finalized by (CST): Bonifacio Herrera MD on 4/19/2024 at 5:44 PM     MRI Brain 4/19/24  CONCLUSION:       Recent subacute infarct with a subcentimeter focus of restricted diffusion, but also showing increased T2/FLAIR signal indicating subacute nature of the infarct within the anterolateral inferior left thalamus.  Focal stenosis identified involving both   right and left proximal posterior cerebral arteries.         LOCATION:  [facility]         Dictated by (CST): Bonifacio Herrera MD on 4/19/2024 at 10:29 PM       Finalized by (CST): Bonifacio Herrera MD on 4/19/2024 at 10:37 PM     SUBJECTIVE       OBJECTIVE   ORAL MOTOR EXAMINATION  Dentition: Natural  Symmetry: Within Functional Limits  Strength: Within Functional Limits     Range of Motion: Reduced right facial (minimal deficit)  Rate of Motion: Within Functional Limits    Voice Quality: Clear  Respiratory Status: Unlabored  Consistencies Trialed: Thin liquids;Puree;Hard solid  Method of Presentation: Self presentation;Staff/Clinician assistance;Spoon;Cup;Straw;Single sips;Consecutive swallows  Patient Positioning: Upright;Midline    Oral Phase of Swallow: Within Functional Limits                      Pharyngeal Phase of Swallow: Within  Functional Limits           (Please note: Silent aspiration cannot be evaluated clinically. Videofluoroscopic Swallow Study is required to rule-out silent aspiration.)    Esophageal Phase of Swallow: No complaints consistent with possible esophageal involvement  Comments: d/w RN              GOALS  Goal #1 The patient will tolerate regular consistency and thin liquids without overt signs or symptoms of aspiration with 95 % accuracy over 1-2 session(s).  In Progress   Goal #2 The patient/family/caregiver will demonstrate understanding and implementation of aspiration precautions and swallow strategies independently over 1-2 session(s).    In Progress   Goal #3 Communication evaluation.  In Progress   Goal #4     Goal #5     Goal #6     Goal #7     Goal #8       FOLLOW UP  Treatment Plan/Recommendations: Communication evaluation  Number of Visits to Meet Established Goals: 1  Follow Up Needed (Documentation Required): Yes  SLP Follow-up Date: 04/26/24    Thank you for your referral.   If you have any questions, please contact GRACIELA Ritter    Electronically signed by Obi Hernandez SLP on 4/25/2024  1:55 PM           Immunizations     Name Date      Ximena Louis 3mg  07/11/18       Multidisciplinary Problems     Active Goals        Problem: Patient/Family Goals    Goal Priority Disciplines Outcome Interventions   Patient/Family Long Term Goal     Interdisciplinary Progressing    Description: Patient's Long Term Goal: discharge in stable condition    Interventions:  - ambulate as tolerated 3x a day  - up to chair for meals  - stabilize blood pressure  - See additional Care Plan goals for specific interventions   Patient/Family Short Term Goal     Interdisciplinary Progressing    Description: Patient's Short Term Goal: pain control    Interventions:   - PRNs as needed  - decrease stress stimuli  - See additional Care Plan goals for specific interventions

## (undated) NOTE — ED AVS SNAPSHOT
[facility] Emergency Department in [address] [facility]    Phone:  [phone]    Fax:  [phone]           Mr. Amanda Guerrero   MRN: [id number]    Department:  [facility] Emergency Department in [place]   Date of Visi IF THERE IS ANY CHANGE OR WORSENING OF YOUR CONDITION, CALL YOUR PRIMARY CARE PHYSICIAN AT ONCE OR RETURN IMMEDIATELY TO THE EMERGENCY DEPARTMENT.     If you have been prescribed any medication(s), please fill your prescription right away and begin taking t

## (undated) NOTE — ED AVS SNAPSHOT
THE [facility] Emergency Department in [address] [facility]    Phone:  [phone]    Fax:  [phone]           Mr. Shirley Schroeder   MRN: [id number]    Department:  THE [facility] Emergency Department in [place]   Date of Visi Si usted tiene algun problema con adams sequimiento, por favor llame a nuestro adminstrador de eladio al [phone]    Expect to receive an electronic request (by e-mail or text) to complete a self-assessment the day after your visit.   You may also receiv Clerance Epley [place] [address] ([address] [facility]) [place] ([address] [facility]) [address] [facility] [address]  ([address]) [address] [facility] Approved by: Rere Tse MD              Narrative:    PROCEDURE:  CT ABDOMEN+PELVIS(CONTRAST ONLY)(CPT=74177)     COMPARISON:  None.      INDICATIONS:  abd pain     TECHNIQUE:  CT scanning was performed from the dome of the diaphragm to the pubic sym [employer] will allow you to access patient instructions from your recent visit,  view other health information, and more. To sign up or find more information, go to [email or web address] and click on the Sign Up Now link in the [employer] box.      Enter